# Patient Record
Sex: MALE | Race: WHITE | URBAN - METROPOLITAN AREA
[De-identification: names, ages, dates, MRNs, and addresses within clinical notes are randomized per-mention and may not be internally consistent; named-entity substitution may affect disease eponyms.]

---

## 2022-12-07 ENCOUNTER — OFFICE (OUTPATIENT)
Dept: URBAN - METROPOLITAN AREA CLINIC 115 | Facility: CLINIC | Age: 67
Setting detail: OPHTHALMOLOGY
End: 2022-12-07
Payer: MEDICARE

## 2022-12-07 DIAGNOSIS — H11.153: ICD-10-CM

## 2022-12-07 DIAGNOSIS — H25.13: ICD-10-CM

## 2022-12-07 DIAGNOSIS — H43.813: ICD-10-CM

## 2022-12-07 DIAGNOSIS — H52.4: ICD-10-CM

## 2022-12-07 PROCEDURE — 92015 DETERMINE REFRACTIVE STATE: CPT | Performed by: OPHTHALMOLOGY

## 2022-12-07 PROCEDURE — 92014 COMPRE OPH EXAM EST PT 1/>: CPT | Performed by: OPHTHALMOLOGY

## 2022-12-07 PROCEDURE — 92250 FUNDUS PHOTOGRAPHY W/I&R: CPT | Performed by: OPHTHALMOLOGY

## 2022-12-07 ASSESSMENT — REFRACTION_CURRENTRX
OD_ADD: +2.50
OS_CYLINDER: -2.50
OD_SPHERE: -3.50
OD_CYLINDER: -1.75
OD_OVR_VA: 20/
OS_OVR_VA: 20/
OD_AXIS: 094
OS_ADD: +2.50
OD_VPRISM_DIRECTION: PROGS
OS_AXIS: 084
OS_SPHERE: -3.25
OS_VPRISM_DIRECTION: PROGS

## 2022-12-07 ASSESSMENT — SPHEQUIV_DERIVED
OD_SPHEQUIV: -4.5
OS_SPHEQUIV: -4.5
OD_SPHEQUIV: -4.625
OD_SPHEQUIV: -4
OS_SPHEQUIV: -4.125
OS_SPHEQUIV: -4.625

## 2022-12-07 ASSESSMENT — CONFRONTATIONAL VISUAL FIELD TEST (CVF)
OS_FINDINGS: FULL
OD_FINDINGS: FULL

## 2022-12-07 ASSESSMENT — REFRACTION_MANIFEST
OD_SPHERE: -3.50
OD_VA1: 20/20
OS_CYLINDER: -2.25
OS_VA1: 20/20
OD_VA1: 20/20
OS_AXIS: 084
OS_VA1: 20/20-
OS_CYLINDER: -2.25
OS_AXIS: 098
OD_AXIS: 095
OS_ADD: +2.50
OS_SPHERE: -3.00
OU_VA: 20/25-
OU_VA: 20/20
OS_SPHERE: -3.50
OD_CYLINDER: -2.00
OD_ADD: +2.50
OD_ADD: +2.50
OD_SPHERE: -3.50
OD_AXIS: 094
OD_CYLINDER: -2.25
OS_ADD: +2.50

## 2022-12-07 ASSESSMENT — VISUAL ACUITY
OS_BCVA: 20/25-1
OD_BCVA: 20/25-1

## 2022-12-07 ASSESSMENT — TONOMETRY
OS_IOP_MMHG: 17
OD_IOP_MMHG: 15

## 2022-12-07 ASSESSMENT — REFRACTION_AUTOREFRACTION
OD_SPHERE: -2.75
OS_AXIS: 096
OS_SPHERE: -3.25
OS_CYLINDER: -2.50
OD_AXIS: 087
OD_CYLINDER: -2.50

## 2023-12-12 ENCOUNTER — OFFICE (OUTPATIENT)
Dept: URBAN - METROPOLITAN AREA CLINIC 115 | Facility: CLINIC | Age: 68
Setting detail: OPHTHALMOLOGY
End: 2023-12-12
Payer: MEDICARE

## 2023-12-12 DIAGNOSIS — H52.4: ICD-10-CM

## 2023-12-12 DIAGNOSIS — H25.13: ICD-10-CM

## 2023-12-12 DIAGNOSIS — H43.813: ICD-10-CM

## 2023-12-12 DIAGNOSIS — H11.153: ICD-10-CM

## 2023-12-12 PROCEDURE — 92014 COMPRE OPH EXAM EST PT 1/>: CPT | Performed by: OPHTHALMOLOGY

## 2023-12-12 PROCEDURE — 92015 DETERMINE REFRACTIVE STATE: CPT | Performed by: OPHTHALMOLOGY

## 2023-12-12 PROCEDURE — 92250 FUNDUS PHOTOGRAPHY W/I&R: CPT | Performed by: OPHTHALMOLOGY

## 2023-12-12 ASSESSMENT — REFRACTION_CURRENTRX
OD_ADD: +2.50
OD_AXIS: 094
OD_OVR_VA: 20/
OS_VPRISM_DIRECTION: PROGS
OS_AXIS: 084
OD_SPHERE: -3.50
OD_VPRISM_DIRECTION: PROGS
OD_CYLINDER: -1.75
OS_ADD: +2.50
OS_CYLINDER: -2.50
OS_SPHERE: -3.25
OS_OVR_VA: 20/

## 2023-12-12 ASSESSMENT — SPHEQUIV_DERIVED
OS_SPHEQUIV: -4.625
OD_SPHEQUIV: -4.5
OS_SPHEQUIV: -4
OS_SPHEQUIV: -4.125
OD_SPHEQUIV: -4
OD_SPHEQUIV: -4.625
OS_SPHEQUIV: -4.5
OD_SPHEQUIV: -4

## 2023-12-12 ASSESSMENT — REFRACTION_MANIFEST
OS_AXIS: 096
OS_VA1: 20/20-
OU_VA: 20/20
OS_SPHERE: -3.00
OD_VA1: 20/20
OD_CYLINDER: -2.00
OD_CYLINDER: -2.25
OS_ADD: +2.50
OD_ADD: +2.50
OU_VA: 20/25-
OS_AXIS: 098
OS_SPHERE: -2.75
OS_AXIS: 084
OD_AXIS: 094
OD_ADD: +2.50
OS_ADD: +2.50
OD_ADD: +2.50
OD_AXIS: 087
OS_CYLINDER: -2.50
OS_SPHERE: -3.50
OD_SPHERE: -3.50
OS_VA1: 20/20
OD_VA1: 20/20
OD_CYLINDER: -2.00
OD_AXIS: 095
OS_ADD: +2.50
OD_VA1: 20/20
OD_SPHERE: -3.00
OS_CYLINDER: -2.25
OS_CYLINDER: -2.25
OD_SPHERE: -3.50
OU_VA: 20/20
OS_VA1: 20/20

## 2023-12-12 ASSESSMENT — CONFRONTATIONAL VISUAL FIELD TEST (CVF)
OS_FINDINGS: FULL
OD_FINDINGS: FULL

## 2023-12-12 ASSESSMENT — REFRACTION_AUTOREFRACTION
OS_AXIS: 096
OD_SPHERE: -2.75
OS_CYLINDER: -2.50
OD_AXIS: 087
OD_CYLINDER: -2.50
OS_SPHERE: -3.25

## 2023-12-25 ENCOUNTER — INPATIENT (INPATIENT)
Facility: HOSPITAL | Age: 68
LOS: 0 days | Discharge: HOME | DRG: 322 | End: 2023-12-26
Attending: STUDENT IN AN ORGANIZED HEALTH CARE EDUCATION/TRAINING PROGRAM | Admitting: INTERNAL MEDICINE
Payer: MEDICARE

## 2023-12-25 VITALS
RESPIRATION RATE: 18 BRPM | DIASTOLIC BLOOD PRESSURE: 56 MMHG | TEMPERATURE: 98 F | SYSTOLIC BLOOD PRESSURE: 136 MMHG | OXYGEN SATURATION: 98 % | HEART RATE: 86 BPM

## 2023-12-25 DIAGNOSIS — I21.4 NON-ST ELEVATION (NSTEMI) MYOCARDIAL INFARCTION: ICD-10-CM

## 2023-12-25 LAB
ALBUMIN SERPL ELPH-MCNC: 3.7 G/DL — SIGNIFICANT CHANGE UP (ref 3.3–5.2)
ALBUMIN SERPL ELPH-MCNC: 3.7 G/DL — SIGNIFICANT CHANGE UP (ref 3.3–5.2)
ALP SERPL-CCNC: 78 U/L — SIGNIFICANT CHANGE UP (ref 40–120)
ALP SERPL-CCNC: 78 U/L — SIGNIFICANT CHANGE UP (ref 40–120)
ALT FLD-CCNC: 26 U/L — SIGNIFICANT CHANGE UP
ALT FLD-CCNC: 26 U/L — SIGNIFICANT CHANGE UP
ANION GAP SERPL CALC-SCNC: 12 MMOL/L — SIGNIFICANT CHANGE UP (ref 5–17)
ANION GAP SERPL CALC-SCNC: 12 MMOL/L — SIGNIFICANT CHANGE UP (ref 5–17)
ANION GAP SERPL CALC-SCNC: 14 MMOL/L — SIGNIFICANT CHANGE UP (ref 5–17)
ANION GAP SERPL CALC-SCNC: 14 MMOL/L — SIGNIFICANT CHANGE UP (ref 5–17)
APTT BLD: 45.4 SEC — HIGH (ref 24.5–35.6)
APTT BLD: 45.4 SEC — HIGH (ref 24.5–35.6)
APTT BLD: 54 SEC — HIGH (ref 24.5–35.6)
APTT BLD: 54 SEC — HIGH (ref 24.5–35.6)
APTT BLD: 57.8 SEC — HIGH (ref 24.5–35.6)
APTT BLD: 57.8 SEC — HIGH (ref 24.5–35.6)
APTT BLD: >200 SEC — CRITICAL HIGH (ref 24.5–35.6)
APTT BLD: >200 SEC — CRITICAL HIGH (ref 24.5–35.6)
AST SERPL-CCNC: 39 U/L — SIGNIFICANT CHANGE UP
AST SERPL-CCNC: 39 U/L — SIGNIFICANT CHANGE UP
BASOPHILS # BLD AUTO: 0.04 K/UL — SIGNIFICANT CHANGE UP (ref 0–0.2)
BASOPHILS # BLD AUTO: 0.04 K/UL — SIGNIFICANT CHANGE UP (ref 0–0.2)
BASOPHILS NFR BLD AUTO: 0.5 % — SIGNIFICANT CHANGE UP (ref 0–2)
BASOPHILS NFR BLD AUTO: 0.5 % — SIGNIFICANT CHANGE UP (ref 0–2)
BILIRUB SERPL-MCNC: 0.3 MG/DL — LOW (ref 0.4–2)
BILIRUB SERPL-MCNC: 0.3 MG/DL — LOW (ref 0.4–2)
BLD GP AB SCN SERPL QL: SIGNIFICANT CHANGE UP
BLD GP AB SCN SERPL QL: SIGNIFICANT CHANGE UP
BUN SERPL-MCNC: 19.7 MG/DL — SIGNIFICANT CHANGE UP (ref 8–20)
BUN SERPL-MCNC: 19.7 MG/DL — SIGNIFICANT CHANGE UP (ref 8–20)
BUN SERPL-MCNC: 26.3 MG/DL — HIGH (ref 8–20)
BUN SERPL-MCNC: 26.3 MG/DL — HIGH (ref 8–20)
CALCIUM SERPL-MCNC: 8.3 MG/DL — LOW (ref 8.4–10.5)
CALCIUM SERPL-MCNC: 8.3 MG/DL — LOW (ref 8.4–10.5)
CALCIUM SERPL-MCNC: 9.5 MG/DL — SIGNIFICANT CHANGE UP (ref 8.4–10.5)
CALCIUM SERPL-MCNC: 9.5 MG/DL — SIGNIFICANT CHANGE UP (ref 8.4–10.5)
CHLORIDE SERPL-SCNC: 106 MMOL/L — SIGNIFICANT CHANGE UP (ref 96–108)
CHLORIDE SERPL-SCNC: 106 MMOL/L — SIGNIFICANT CHANGE UP (ref 96–108)
CHLORIDE SERPL-SCNC: 110 MMOL/L — HIGH (ref 96–108)
CHLORIDE SERPL-SCNC: 110 MMOL/L — HIGH (ref 96–108)
CHOLEST SERPL-MCNC: 103 MG/DL — SIGNIFICANT CHANGE UP
CHOLEST SERPL-MCNC: 103 MG/DL — SIGNIFICANT CHANGE UP
CK MB CFR SERPL CALC: 7.8 NG/ML — HIGH (ref 0–6.7)
CK MB CFR SERPL CALC: 7.8 NG/ML — HIGH (ref 0–6.7)
CK SERPL-CCNC: 347 U/L — HIGH (ref 30–200)
CK SERPL-CCNC: 347 U/L — HIGH (ref 30–200)
CO2 SERPL-SCNC: 19 MMOL/L — LOW (ref 22–29)
CO2 SERPL-SCNC: 19 MMOL/L — LOW (ref 22–29)
CO2 SERPL-SCNC: 20 MMOL/L — LOW (ref 22–29)
CO2 SERPL-SCNC: 20 MMOL/L — LOW (ref 22–29)
CREAT SERPL-MCNC: 0.82 MG/DL — SIGNIFICANT CHANGE UP (ref 0.5–1.3)
CREAT SERPL-MCNC: 0.82 MG/DL — SIGNIFICANT CHANGE UP (ref 0.5–1.3)
CREAT SERPL-MCNC: 0.98 MG/DL — SIGNIFICANT CHANGE UP (ref 0.5–1.3)
CREAT SERPL-MCNC: 0.98 MG/DL — SIGNIFICANT CHANGE UP (ref 0.5–1.3)
EGFR: 83 ML/MIN/1.73M2 — SIGNIFICANT CHANGE UP
EGFR: 83 ML/MIN/1.73M2 — SIGNIFICANT CHANGE UP
EGFR: 95 ML/MIN/1.73M2 — SIGNIFICANT CHANGE UP
EGFR: 95 ML/MIN/1.73M2 — SIGNIFICANT CHANGE UP
EOSINOPHIL # BLD AUTO: 0.06 K/UL — SIGNIFICANT CHANGE UP (ref 0–0.5)
EOSINOPHIL # BLD AUTO: 0.06 K/UL — SIGNIFICANT CHANGE UP (ref 0–0.5)
EOSINOPHIL NFR BLD AUTO: 0.8 % — SIGNIFICANT CHANGE UP (ref 0–6)
EOSINOPHIL NFR BLD AUTO: 0.8 % — SIGNIFICANT CHANGE UP (ref 0–6)
GLUCOSE SERPL-MCNC: 133 MG/DL — HIGH (ref 70–99)
GLUCOSE SERPL-MCNC: 133 MG/DL — HIGH (ref 70–99)
GLUCOSE SERPL-MCNC: 141 MG/DL — HIGH (ref 70–99)
GLUCOSE SERPL-MCNC: 141 MG/DL — HIGH (ref 70–99)
HCT VFR BLD CALC: 39.1 % — SIGNIFICANT CHANGE UP (ref 39–50)
HCT VFR BLD CALC: 39.1 % — SIGNIFICANT CHANGE UP (ref 39–50)
HCT VFR BLD CALC: 40.5 % — SIGNIFICANT CHANGE UP (ref 39–50)
HCT VFR BLD CALC: 40.5 % — SIGNIFICANT CHANGE UP (ref 39–50)
HCT VFR BLD CALC: 43.4 % — SIGNIFICANT CHANGE UP (ref 39–50)
HCT VFR BLD CALC: 43.4 % — SIGNIFICANT CHANGE UP (ref 39–50)
HCV AB S/CO SERPL IA: 0.1 S/CO — SIGNIFICANT CHANGE UP (ref 0–0.99)
HCV AB S/CO SERPL IA: 0.1 S/CO — SIGNIFICANT CHANGE UP (ref 0–0.99)
HCV AB SERPL-IMP: SIGNIFICANT CHANGE UP
HCV AB SERPL-IMP: SIGNIFICANT CHANGE UP
HDLC SERPL-MCNC: 44 MG/DL — SIGNIFICANT CHANGE UP
HDLC SERPL-MCNC: 44 MG/DL — SIGNIFICANT CHANGE UP
HGB BLD-MCNC: 13.5 G/DL — SIGNIFICANT CHANGE UP (ref 13–17)
HGB BLD-MCNC: 13.5 G/DL — SIGNIFICANT CHANGE UP (ref 13–17)
HGB BLD-MCNC: 14.6 G/DL — SIGNIFICANT CHANGE UP (ref 13–17)
HGB BLD-MCNC: 14.6 G/DL — SIGNIFICANT CHANGE UP (ref 13–17)
HGB BLD-MCNC: 15.2 G/DL — SIGNIFICANT CHANGE UP (ref 13–17)
HGB BLD-MCNC: 15.2 G/DL — SIGNIFICANT CHANGE UP (ref 13–17)
IMM GRANULOCYTES NFR BLD AUTO: 0.5 % — SIGNIFICANT CHANGE UP (ref 0–0.9)
IMM GRANULOCYTES NFR BLD AUTO: 0.5 % — SIGNIFICANT CHANGE UP (ref 0–0.9)
INR BLD: 1.02 RATIO — SIGNIFICANT CHANGE UP (ref 0.85–1.18)
INR BLD: 1.02 RATIO — SIGNIFICANT CHANGE UP (ref 0.85–1.18)
LIPID PNL WITH DIRECT LDL SERPL: 35 MG/DL — SIGNIFICANT CHANGE UP
LIPID PNL WITH DIRECT LDL SERPL: 35 MG/DL — SIGNIFICANT CHANGE UP
LYMPHOCYTES # BLD AUTO: 1.77 K/UL — SIGNIFICANT CHANGE UP (ref 1–3.3)
LYMPHOCYTES # BLD AUTO: 1.77 K/UL — SIGNIFICANT CHANGE UP (ref 1–3.3)
LYMPHOCYTES # BLD AUTO: 22.4 % — SIGNIFICANT CHANGE UP (ref 13–44)
LYMPHOCYTES # BLD AUTO: 22.4 % — SIGNIFICANT CHANGE UP (ref 13–44)
MAGNESIUM SERPL-MCNC: 1.9 MG/DL — SIGNIFICANT CHANGE UP (ref 1.6–2.6)
MAGNESIUM SERPL-MCNC: 1.9 MG/DL — SIGNIFICANT CHANGE UP (ref 1.6–2.6)
MCHC RBC-ENTMCNC: 30.5 PG — SIGNIFICANT CHANGE UP (ref 27–34)
MCHC RBC-ENTMCNC: 30.5 PG — SIGNIFICANT CHANGE UP (ref 27–34)
MCHC RBC-ENTMCNC: 30.7 PG — SIGNIFICANT CHANGE UP (ref 27–34)
MCHC RBC-ENTMCNC: 30.7 PG — SIGNIFICANT CHANGE UP (ref 27–34)
MCHC RBC-ENTMCNC: 31.7 PG — SIGNIFICANT CHANGE UP (ref 27–34)
MCHC RBC-ENTMCNC: 31.7 PG — SIGNIFICANT CHANGE UP (ref 27–34)
MCHC RBC-ENTMCNC: 34.5 GM/DL — SIGNIFICANT CHANGE UP (ref 32–36)
MCHC RBC-ENTMCNC: 34.5 GM/DL — SIGNIFICANT CHANGE UP (ref 32–36)
MCHC RBC-ENTMCNC: 35 GM/DL — SIGNIFICANT CHANGE UP (ref 32–36)
MCHC RBC-ENTMCNC: 35 GM/DL — SIGNIFICANT CHANGE UP (ref 32–36)
MCHC RBC-ENTMCNC: 36 GM/DL — SIGNIFICANT CHANGE UP (ref 32–36)
MCHC RBC-ENTMCNC: 36 GM/DL — SIGNIFICANT CHANGE UP (ref 32–36)
MCV RBC AUTO: 87.1 FL — SIGNIFICANT CHANGE UP (ref 80–100)
MCV RBC AUTO: 87.1 FL — SIGNIFICANT CHANGE UP (ref 80–100)
MCV RBC AUTO: 88 FL — SIGNIFICANT CHANGE UP (ref 80–100)
MCV RBC AUTO: 88 FL — SIGNIFICANT CHANGE UP (ref 80–100)
MCV RBC AUTO: 88.9 FL — SIGNIFICANT CHANGE UP (ref 80–100)
MCV RBC AUTO: 88.9 FL — SIGNIFICANT CHANGE UP (ref 80–100)
MONOCYTES # BLD AUTO: 0.8 K/UL — SIGNIFICANT CHANGE UP (ref 0–0.9)
MONOCYTES # BLD AUTO: 0.8 K/UL — SIGNIFICANT CHANGE UP (ref 0–0.9)
MONOCYTES NFR BLD AUTO: 10.1 % — SIGNIFICANT CHANGE UP (ref 2–14)
MONOCYTES NFR BLD AUTO: 10.1 % — SIGNIFICANT CHANGE UP (ref 2–14)
NEUTROPHILS # BLD AUTO: 5.2 K/UL — SIGNIFICANT CHANGE UP (ref 1.8–7.4)
NEUTROPHILS # BLD AUTO: 5.2 K/UL — SIGNIFICANT CHANGE UP (ref 1.8–7.4)
NEUTROPHILS NFR BLD AUTO: 65.7 % — SIGNIFICANT CHANGE UP (ref 43–77)
NEUTROPHILS NFR BLD AUTO: 65.7 % — SIGNIFICANT CHANGE UP (ref 43–77)
NON HDL CHOLESTEROL: 59 MG/DL — SIGNIFICANT CHANGE UP
NON HDL CHOLESTEROL: 59 MG/DL — SIGNIFICANT CHANGE UP
NT-PROBNP SERPL-SCNC: 214 PG/ML — SIGNIFICANT CHANGE UP (ref 0–300)
NT-PROBNP SERPL-SCNC: 214 PG/ML — SIGNIFICANT CHANGE UP (ref 0–300)
PHOSPHATE SERPL-MCNC: 2.6 MG/DL — SIGNIFICANT CHANGE UP (ref 2.4–4.7)
PHOSPHATE SERPL-MCNC: 2.6 MG/DL — SIGNIFICANT CHANGE UP (ref 2.4–4.7)
PLATELET # BLD AUTO: 156 K/UL — SIGNIFICANT CHANGE UP (ref 150–400)
PLATELET # BLD AUTO: 156 K/UL — SIGNIFICANT CHANGE UP (ref 150–400)
PLATELET # BLD AUTO: 163 K/UL — SIGNIFICANT CHANGE UP (ref 150–400)
PLATELET # BLD AUTO: 163 K/UL — SIGNIFICANT CHANGE UP (ref 150–400)
PLATELET # BLD AUTO: 185 K/UL — SIGNIFICANT CHANGE UP (ref 150–400)
PLATELET # BLD AUTO: 185 K/UL — SIGNIFICANT CHANGE UP (ref 150–400)
POTASSIUM SERPL-MCNC: 3.8 MMOL/L — SIGNIFICANT CHANGE UP (ref 3.5–5.3)
POTASSIUM SERPL-MCNC: 3.8 MMOL/L — SIGNIFICANT CHANGE UP (ref 3.5–5.3)
POTASSIUM SERPL-MCNC: 4 MMOL/L — SIGNIFICANT CHANGE UP (ref 3.5–5.3)
POTASSIUM SERPL-MCNC: 4 MMOL/L — SIGNIFICANT CHANGE UP (ref 3.5–5.3)
POTASSIUM SERPL-SCNC: 3.8 MMOL/L — SIGNIFICANT CHANGE UP (ref 3.5–5.3)
POTASSIUM SERPL-SCNC: 3.8 MMOL/L — SIGNIFICANT CHANGE UP (ref 3.5–5.3)
POTASSIUM SERPL-SCNC: 4 MMOL/L — SIGNIFICANT CHANGE UP (ref 3.5–5.3)
POTASSIUM SERPL-SCNC: 4 MMOL/L — SIGNIFICANT CHANGE UP (ref 3.5–5.3)
PROT SERPL-MCNC: 5.5 G/DL — LOW (ref 6.6–8.7)
PROT SERPL-MCNC: 5.5 G/DL — LOW (ref 6.6–8.7)
PROTHROM AB SERPL-ACNC: 11.3 SEC — SIGNIFICANT CHANGE UP (ref 9.5–13)
PROTHROM AB SERPL-ACNC: 11.3 SEC — SIGNIFICANT CHANGE UP (ref 9.5–13)
RBC # BLD: 4.4 M/UL — SIGNIFICANT CHANGE UP (ref 4.2–5.8)
RBC # BLD: 4.4 M/UL — SIGNIFICANT CHANGE UP (ref 4.2–5.8)
RBC # BLD: 4.6 M/UL — SIGNIFICANT CHANGE UP (ref 4.2–5.8)
RBC # BLD: 4.6 M/UL — SIGNIFICANT CHANGE UP (ref 4.2–5.8)
RBC # BLD: 4.98 M/UL — SIGNIFICANT CHANGE UP (ref 4.2–5.8)
RBC # BLD: 4.98 M/UL — SIGNIFICANT CHANGE UP (ref 4.2–5.8)
RBC # FLD: 12.6 % — SIGNIFICANT CHANGE UP (ref 10.3–14.5)
RBC # FLD: 12.6 % — SIGNIFICANT CHANGE UP (ref 10.3–14.5)
RBC # FLD: 12.8 % — SIGNIFICANT CHANGE UP (ref 10.3–14.5)
RBC # FLD: 12.8 % — SIGNIFICANT CHANGE UP (ref 10.3–14.5)
RBC # FLD: 13.2 % — SIGNIFICANT CHANGE UP (ref 10.3–14.5)
RBC # FLD: 13.2 % — SIGNIFICANT CHANGE UP (ref 10.3–14.5)
SODIUM SERPL-SCNC: 139 MMOL/L — SIGNIFICANT CHANGE UP (ref 135–145)
SODIUM SERPL-SCNC: 139 MMOL/L — SIGNIFICANT CHANGE UP (ref 135–145)
SODIUM SERPL-SCNC: 142 MMOL/L — SIGNIFICANT CHANGE UP (ref 135–145)
SODIUM SERPL-SCNC: 142 MMOL/L — SIGNIFICANT CHANGE UP (ref 135–145)
T3 SERPL-MCNC: 117 NG/DL — SIGNIFICANT CHANGE UP (ref 80–200)
T3 SERPL-MCNC: 117 NG/DL — SIGNIFICANT CHANGE UP (ref 80–200)
T4 AB SER-ACNC: 6.5 UG/DL — SIGNIFICANT CHANGE UP (ref 4.5–12)
T4 AB SER-ACNC: 6.5 UG/DL — SIGNIFICANT CHANGE UP (ref 4.5–12)
TRIGL SERPL-MCNC: 122 MG/DL — SIGNIFICANT CHANGE UP
TRIGL SERPL-MCNC: 122 MG/DL — SIGNIFICANT CHANGE UP
TROPONIN T, HIGH SENSITIVITY RESULT: 39 NG/L — SIGNIFICANT CHANGE UP (ref 0–51)
TROPONIN T, HIGH SENSITIVITY RESULT: 39 NG/L — SIGNIFICANT CHANGE UP (ref 0–51)
TROPONIN T, HIGH SENSITIVITY RESULT: 63 NG/L — HIGH (ref 0–51)
TROPONIN T, HIGH SENSITIVITY RESULT: 63 NG/L — HIGH (ref 0–51)
TROPONIN T, HIGH SENSITIVITY RESULT: 80 NG/L — HIGH (ref 0–51)
TROPONIN T, HIGH SENSITIVITY RESULT: 80 NG/L — HIGH (ref 0–51)
TSH SERPL-MCNC: 1.57 UIU/ML — SIGNIFICANT CHANGE UP (ref 0.27–4.2)
TSH SERPL-MCNC: 1.57 UIU/ML — SIGNIFICANT CHANGE UP (ref 0.27–4.2)
WBC # BLD: 7.25 K/UL — SIGNIFICANT CHANGE UP (ref 3.8–10.5)
WBC # BLD: 7.25 K/UL — SIGNIFICANT CHANGE UP (ref 3.8–10.5)
WBC # BLD: 7.91 K/UL — SIGNIFICANT CHANGE UP (ref 3.8–10.5)
WBC # BLD: 7.91 K/UL — SIGNIFICANT CHANGE UP (ref 3.8–10.5)
WBC # BLD: 8.27 K/UL — SIGNIFICANT CHANGE UP (ref 3.8–10.5)
WBC # BLD: 8.27 K/UL — SIGNIFICANT CHANGE UP (ref 3.8–10.5)
WBC # FLD AUTO: 7.25 K/UL — SIGNIFICANT CHANGE UP (ref 3.8–10.5)
WBC # FLD AUTO: 7.25 K/UL — SIGNIFICANT CHANGE UP (ref 3.8–10.5)
WBC # FLD AUTO: 7.91 K/UL — SIGNIFICANT CHANGE UP (ref 3.8–10.5)
WBC # FLD AUTO: 7.91 K/UL — SIGNIFICANT CHANGE UP (ref 3.8–10.5)
WBC # FLD AUTO: 8.27 K/UL — SIGNIFICANT CHANGE UP (ref 3.8–10.5)
WBC # FLD AUTO: 8.27 K/UL — SIGNIFICANT CHANGE UP (ref 3.8–10.5)

## 2023-12-25 PROCEDURE — 71045 X-RAY EXAM CHEST 1 VIEW: CPT | Mod: 26

## 2023-12-25 PROCEDURE — 99291 CRITICAL CARE FIRST HOUR: CPT

## 2023-12-25 PROCEDURE — 93010 ELECTROCARDIOGRAM REPORT: CPT | Mod: 76

## 2023-12-25 PROCEDURE — 99233 SBSQ HOSP IP/OBS HIGH 50: CPT

## 2023-12-25 PROCEDURE — 93306 TTE W/DOPPLER COMPLETE: CPT | Mod: 26

## 2023-12-25 RX ORDER — ATORVASTATIN CALCIUM 80 MG/1
80 TABLET, FILM COATED ORAL AT BEDTIME
Refills: 0 | Status: DISCONTINUED | OUTPATIENT
Start: 2023-12-25 | End: 2023-12-26

## 2023-12-25 RX ORDER — ESMOLOL HCL 100MG/10ML
39100 VIAL (ML) INTRAVENOUS ONCE
Refills: 0 | Status: COMPLETED | OUTPATIENT
Start: 2023-12-25 | End: 2023-12-25

## 2023-12-25 RX ORDER — METOPROLOL TARTRATE 50 MG
5 TABLET ORAL ONCE
Refills: 0 | Status: COMPLETED | OUTPATIENT
Start: 2023-12-25 | End: 2023-12-25

## 2023-12-25 RX ORDER — TICAGRELOR 90 MG/1
180 TABLET ORAL ONCE
Refills: 0 | Status: COMPLETED | OUTPATIENT
Start: 2023-12-25 | End: 2023-12-25

## 2023-12-25 RX ORDER — METOPROLOL TARTRATE 50 MG
25 TABLET ORAL EVERY 8 HOURS
Refills: 0 | Status: DISCONTINUED | OUTPATIENT
Start: 2023-12-25 | End: 2023-12-25

## 2023-12-25 RX ORDER — HEPARIN SODIUM 5000 [USP'U]/ML
4700 INJECTION INTRAVENOUS; SUBCUTANEOUS EVERY 6 HOURS
Refills: 0 | Status: DISCONTINUED | OUTPATIENT
Start: 2023-12-25 | End: 2023-12-26

## 2023-12-25 RX ORDER — HEPARIN SODIUM 5000 [USP'U]/ML
4700 INJECTION INTRAVENOUS; SUBCUTANEOUS ONCE
Refills: 0 | Status: COMPLETED | OUTPATIENT
Start: 2023-12-25 | End: 2023-12-25

## 2023-12-25 RX ORDER — METOPROLOL TARTRATE 50 MG
25 TABLET ORAL EVERY 12 HOURS
Refills: 0 | Status: DISCONTINUED | OUTPATIENT
Start: 2023-12-25 | End: 2023-12-26

## 2023-12-25 RX ORDER — LEVOTHYROXINE SODIUM 125 MCG
88 TABLET ORAL DAILY
Refills: 0 | Status: DISCONTINUED | OUTPATIENT
Start: 2023-12-25 | End: 2023-12-26

## 2023-12-25 RX ORDER — LEVOTHYROXINE SODIUM 125 MCG
75 TABLET ORAL DAILY
Refills: 0 | Status: DISCONTINUED | OUTPATIENT
Start: 2023-12-25 | End: 2023-12-25

## 2023-12-25 RX ORDER — ESMOLOL HCL 100MG/10ML
50 VIAL (ML) INTRAVENOUS
Qty: 2500 | Refills: 0 | Status: DISCONTINUED | OUTPATIENT
Start: 2023-12-25 | End: 2023-12-25

## 2023-12-25 RX ORDER — SODIUM CHLORIDE 9 MG/ML
1000 INJECTION INTRAMUSCULAR; INTRAVENOUS; SUBCUTANEOUS ONCE
Refills: 0 | Status: COMPLETED | OUTPATIENT
Start: 2023-12-25 | End: 2023-12-25

## 2023-12-25 RX ORDER — MAGNESIUM SULFATE 500 MG/ML
2 VIAL (ML) INJECTION ONCE
Refills: 0 | Status: COMPLETED | OUTPATIENT
Start: 2023-12-25 | End: 2023-12-25

## 2023-12-25 RX ORDER — NITROGLYCERIN 6.5 MG
0.4 CAPSULE, EXTENDED RELEASE ORAL ONCE
Refills: 0 | Status: COMPLETED | OUTPATIENT
Start: 2023-12-25 | End: 2023-12-25

## 2023-12-25 RX ORDER — HEPARIN SODIUM 5000 [USP'U]/ML
700 INJECTION INTRAVENOUS; SUBCUTANEOUS
Qty: 25000 | Refills: 0 | Status: DISCONTINUED | OUTPATIENT
Start: 2023-12-25 | End: 2023-12-26

## 2023-12-25 RX ORDER — ASPIRIN/CALCIUM CARB/MAGNESIUM 324 MG
325 TABLET ORAL DAILY
Refills: 0 | Status: DISCONTINUED | OUTPATIENT
Start: 2023-12-25 | End: 2023-12-26

## 2023-12-25 RX ORDER — HEPARIN SODIUM 5000 [USP'U]/ML
INJECTION INTRAVENOUS; SUBCUTANEOUS
Qty: 25000 | Refills: 0 | Status: DISCONTINUED | OUTPATIENT
Start: 2023-12-25 | End: 2023-12-25

## 2023-12-25 RX ADMIN — ATORVASTATIN CALCIUM 80 MILLIGRAM(S): 80 TABLET, FILM COATED ORAL at 21:47

## 2023-12-25 RX ADMIN — Medication 25 MILLIGRAM(S): at 11:51

## 2023-12-25 RX ADMIN — HEPARIN SODIUM 900 UNIT(S)/HR: 5000 INJECTION INTRAVENOUS; SUBCUTANEOUS at 22:49

## 2023-12-25 RX ADMIN — Medication 75 MICROGRAM(S): at 12:56

## 2023-12-25 RX ADMIN — HEPARIN SODIUM 950 UNIT(S)/HR: 5000 INJECTION INTRAVENOUS; SUBCUTANEOUS at 03:49

## 2023-12-25 RX ADMIN — Medication 39100 MICROGRAM(S): at 03:59

## 2023-12-25 RX ADMIN — Medication 23.5 MICROGRAM(S)/KG/MIN: at 09:09

## 2023-12-25 RX ADMIN — Medication 25 GRAM(S): at 10:17

## 2023-12-25 RX ADMIN — Medication 5 MILLIGRAM(S): at 03:48

## 2023-12-25 RX ADMIN — HEPARIN SODIUM 700 UNIT(S)/HR: 5000 INJECTION INTRAVENOUS; SUBCUTANEOUS at 11:23

## 2023-12-25 RX ADMIN — HEPARIN SODIUM 0 UNIT(S)/HR: 5000 INJECTION INTRAVENOUS; SUBCUTANEOUS at 09:09

## 2023-12-25 RX ADMIN — SODIUM CHLORIDE 1000 MILLILITER(S): 9 INJECTION INTRAMUSCULAR; INTRAVENOUS; SUBCUTANEOUS at 03:52

## 2023-12-25 RX ADMIN — Medication 23.5 MICROGRAM(S)/KG/MIN: at 03:58

## 2023-12-25 RX ADMIN — HEPARIN SODIUM 900 UNIT(S)/HR: 5000 INJECTION INTRAVENOUS; SUBCUTANEOUS at 17:35

## 2023-12-25 RX ADMIN — Medication 0.4 MILLIGRAM(S): at 03:34

## 2023-12-25 RX ADMIN — TICAGRELOR 180 MILLIGRAM(S): 90 TABLET ORAL at 03:50

## 2023-12-25 RX ADMIN — HEPARIN SODIUM 4700 UNIT(S): 5000 INJECTION INTRAVENOUS; SUBCUTANEOUS at 03:50

## 2023-12-25 RX ADMIN — Medication 0.4 MILLIGRAM(S): at 03:42

## 2023-12-25 RX ADMIN — Medication 325 MILLIGRAM(S): at 11:21

## 2023-12-25 NOTE — ED PROVIDER NOTE - PROGRESS NOTE DETAILS
became hypotensive with second nitro, pressure bag, ivf started, with improvement in BP. IV lopressor given.  discussed with cards again, esmolol started. HR improved  ICU consulted. Was notified by RN that pt's CP worsening, rpt EKG ordered. rpt trop +. discussed with stemi doc, ischemic ekg, rpt ekg with diffuse ST depressions.. 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 new afib, no hx of afib. denies being on any blood thinning meds.   given ntg and Brilinta, started on heparin bolus/drip. pt to be admitted to ICU for further management. Was notified by RN that pt's CP worsening, rpt EKG ordered. rpt trop +. discussed with stemi doc, ischemic ekg, rpt ekg with diffuse ST depressions. 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 new afib, no hx of afib. denies being on any blood thinning meds.   given ntg and Brilinta, started on heparin bolus/drip. became hypotensive with second nitro, pressure bag ivf started, with improvement in BP. IV lopressor given.  discussed with cards again, esmolol started. HR improved  ICU consulted.

## 2023-12-25 NOTE — ED ADULT NURSE REASSESSMENT NOTE - NS ED NURSE REASSESS COMMENT FT1
assumed care of pt at 0715. report received from NATALYA Velasquez. charting as noted. rr even and unlabored. anox4. continued cardiac monitoring in place. iv intact. pt currently have esmolol and heparin infusing at this time. denies chest pain sob or dizziness at this time. pt educated on plan of care, pt able to successfully teach back plan of care to RN, RN will continue to reeducate pt during hospital stay.

## 2023-12-25 NOTE — H&P ADULT - NS PANP OPT1 GEN_ALL_CORE
No pertinent family history in first degree relatives
I independently performed the documented history, exam, and medical decision making.

## 2023-12-25 NOTE — H&P ADULT - ASSESSMENT
69 y/o male with pmhx of triple bypass in 2006, hair cell leukemia 2006 (in remission), bladder CA s/p TURP x 2 in remission, HTN, and HLD admitted for afib RVR.     -Ischemic changes on EKG were transient in nature and believed to be related to episode of afib RVR. Will undergo nonurgent cardiac cath by Dr. Mauricio today.   -Actively trending cardiac enzymes for peak. Received ASA and Brilinta and was started on heparin gtt.    -Chest pain now resolved, however pt developed hypotension after sublingual ntiro and lopressor. Was given bolus IVF fluid with improvement.   -Pt started on esmolol gtt to achieve rate control for afib. Titrating to goal HR < 100. Will transition to longer acting BB as hemodynamically tolerated.   -Resuming home statin and aspirin   -Ordered formal echo for morning   -Continue with levothyroxine. Thyroid function tests pending   -Continue to trend laboratory markers of perfusion.     CRITICAL CARE TIME SPENT: 35 minutes   Time spent evaluating/treating patient with medical issues that pose a high risk for life threatening deterioration, and/or end-organ damage, reviewing data/labs/imaging, discussing case with multidisciplinary team, discussing plan/goals of care with patient/family. Non-inclusive of procedure time. Date of entry of this note is equal to the date of services rendered.     Case Discussed with ICU Attending, Dr. Rangel    69 y/o male with pmhx of triple bypass in 2006, hair cell leukemia 2006 (in remission), bladder CA s/p TURP x 2 in remission, HTN, and HLD admitted for afib RVR.     -Ischemic changes on EKG were transient in nature and believed to be related to episode of afib RVR. Will undergo nonurgent cardiac cath by Dr. Mauricio today.   -Actively trending cardiac enzymes for peak. Received ASA and Brilinta and was started on heparin gtt.    -Chest pain now resolved, however pt developed hypotension after sublingual ntiro and lopressor. Was given bolus IVF fluid with improvement.   -Pt started on esmolol gtt to achieve rate control for afib. Titrating to goal HR < 100. Will transition to longer acting BB as hemodynamically tolerated.   -Resuming home statin and aspirin. Lipid panel ordered.   -Ordered formal echo for morning   -Continue with levothyroxine. Thyroid function tests pending   -Continue to trend laboratory markers of perfusion.     CRITICAL CARE TIME SPENT: 35 minutes   Time spent evaluating/treating patient with medical issues that pose a high risk for life threatening deterioration, and/or end-organ damage, reviewing data/labs/imaging, discussing case with multidisciplinary team, discussing plan/goals of care with patient/family. Non-inclusive of procedure time. Date of entry of this note is equal to the date of services rendered.     Case Discussed with ICU Attending, Dr. Rangel

## 2023-12-25 NOTE — ED PROVIDER NOTE - CARE PLAN
Principal Discharge DX:	NSTEMI (non-ST elevation myocardial infarction)  Secondary Diagnosis:	Atrial fibrillation with RVR   1

## 2023-12-25 NOTE — H&P ADULT - NS PANP COMMENT GEN_ALL_CORE FT
68M former smoker with hx of CAD s/p CABG, hair cell leukemia 2006 (in remission), bladder CA s/p TURP x 2 in remission, HTN, HLD presented with acute onset severe chest pain with associated diaphoresis and dyspnea. Pt was in usual state of health prior to this. He was following with [facility] and reports stress test last year that was normal per his understanding. Pt reports consumption of multiple alcoholic drinks. Pt had recurrent episodes of severe chest pain while in the ED and was noted to be in Afib RVR with no reported prior hx of this. Pt was given sublingual x 2 initially for the chest pain but developed hypotension after the second dose requiring bolus. EKG demonstrated Afib with RVR with diffuse ST depressions. Interventional cardiologist was contacted by ED for consideration for possible cath and advised starting esmolol drip for rate control. ST depressions improved with better rate control and chest pain resolved when seen. Troponin uptrending. Started on heparin gtt and loaded with brilinta per cardiology with plan for cardiac cath today. Will monitor in ICU given concern for unstable angina and Afib with RVR. Titrate esmolol as tolerated to maintain HR<110. F/U echo. Trend troponin to peak. Follow-up cardiology recs. GDMT as tolerated. Reports he has chronically been taking ASA 325mg since CABG. F/U TFTs. 68M former smoker with hx of CAD s/p CABG, hair cell leukemia 2006 (in remission), bladder CA s/p TURP x 2 in remission, HTN, HLD presented with acute onset severe chest pain with associated diaphoresis and dyspnea. Pt was in usual state of health prior to this. He was following with [facility] and reports stress test last year that was normal per his understanding. Pt reports consumption of multiple alcoholic drinks. Pt had recurrent episodes of severe chest pain while in the ED and was noted to be in Afib RVR with no reported prior hx of this. Pt was given sublingual x 2 initially for the chest pain but developed hypotension after the second dose requiring bolus. EKG demonstrated Afib with RVR with diffuse ST depressions. Interventional cardiologist was contacted by ED for consideration for possible cath and advised starting esmolol drip for rate control. ST depressions improved with better rate control and chest pain resolved when seen. Troponin uptrending. Started on heparin gtt and loaded with brilinta per cardiology with plan for cardiac cath today. Will monitor in ICU given concern for unstable angina and Afib with RVR. Titrate esmolol as tolerated to maintain HR<110. F/U echo. Trend troponin to peak. Follow-up cardiology recs. GDMT as tolerated. Reports he has chronically been taking ASA 325mg since CABG. F/U TFTs.

## 2023-12-25 NOTE — CHART NOTE - NSCHARTNOTEFT_GEN_A_CORE
INTERVAL HPI: Pt evaluated at bedside, pt reports resolution in symptoms of chest pain. Pt's afib is currently rate controlled, the need for full AC in afib discussed with pt and family. Cardiology consult placed. Non-urgent catheterisation deferred by cardiology due to resolution in symptoms and ischemia attributed to demand ischemia.     MEDICATIONS  (STANDING):  aspirin 325 milliGRAM(s) Oral daily  atorvastatin 80 milliGRAM(s) Oral at bedtime  esmolol  Infusion 50 MICROgram(s)/kG/Min (23.5 mL/Hr) IV Continuous <Continuous>  heparin  Infusion. 700 Unit(s)/Hr (7 mL/Hr) IV Continuous <Continuous>  levothyroxine 88 MICROGram(s) Oral daily  metoprolol tartrate 25 milliGRAM(s) Oral every 8 hours    MEDICATIONS  (PRN):  heparin   Injectable 4700 Unit(s) IV Push every 6 hours PRN For aPTT less than 40    Allergies    No Known Allergies    Intolerances    REVIEW OF SYSTEMS  CONSTITUTIONAL: No fever, weight loss, or fatigue  RESPIRATORY: No cough, wheezing, chills or hemoptysis; No shortness of breath  CARDIOVASCULAR: No chest pain, palpitations, dizziness, or leg swelling  GASTROINTESTINAL: No abdominal or epigastric pain. No nausea, vomiting, or hematemesis; No diarrhea or constipation. No melena or hematochezia.  NEUROLOGICAL: No headaches, memory loss, loss of strength, numbness, or tremors  MUSCULOSKELETAL: No joint pain or swelling; No muscle, back, or extremity pain      Vital Signs Last 24 Hrs  T(C): 36.4 (25 Dec 2023 15:23), Max: 37.2 (25 Dec 2023 07:19)  T(F): 97.5 (25 Dec 2023 15:23), Max: 98.9 (25 Dec 2023 07:19)  HR: 61 (25 Dec 2023 15:00) (58 - 142)  BP: 111/60 (25 Dec 2023 15:00) (87/62 - 147/69)  BP(mean): 73 (25 Dec 2023 15:00) (70 - 111)  RR: 17 (25 Dec 2023 15:00) (12 - 33)  SpO2: 96% (25 Dec 2023 15:00) (93% - 98%)    Parameters below as of 25 Dec 2023 14:00  Patient On (Oxygen Delivery Method): room air    PHYSICAL EXAM  GENERAL: Comfortable in bed  HEAD:  Atraumatic, Normocephalic  EYES: Conjunctiva and sclera clear  NECK: Supple  NERVOUS SYSTEM:  Alert & Oriented X3, No gross focal deficits  CHEST/LUNG: Clear to auscultation bilaterally  HEART: Regular rate and rhythm  ABDOMEN: Soft, Nontender, Nondistended; Bowel sounds present  EXTREMITIES:  No edema  SKIN: No rashes or lesions    LABS:                        13.5   8.27  )-----------( 156      ( 25 Dec 2023 09:30 )             39.1     12-25    142  |  110<H>  |  19.7  ----------------------------<  133<H>  4.0   |  20.0<L>  |  0.82    Ca    8.3<L>      25 Dec 2023 09:30  Phos  2.6     12-25  Mg     1.9     12-25    TPro  5.5<L>  /  Alb  3.7  /  TBili  0.3<L>  /  DBili  x   /  AST  39  /  ALT  26  /  AlkPhos  78  12-25    PT/INR - ( 25 Dec 2023 03:51 )   PT: 11.3 sec;   INR: 1.02 ratio         PTT - ( 25 Dec 2023 10:20 )  PTT:54.0 sec  Urinalysis Basic - ( 25 Dec 2023 09:30 )    Color: x / Appearance: x / SG: x / pH: x  Gluc: 133 mg/dL / Ketone: x  / Bili: x / Urobili: x   Blood: x / Protein: x / Nitrite: x   Leuk Esterase: x / RBC: x / WBC x   Sq Epi: x / Non Sq Epi: x / Bacteria: x    Assessment and Plan    67 y/o male with pmhx of triple bypass in 2006, hair cell leukemia 2006 (in remission), bladder CA s/p TURP x 2 in remission, HTN, and HLD admitted for afib RVR    Neuro  - Neurologically intact  - Avoid sedating medication    Cardio  - HDS stable off pressors  - CHADsVasc - 3, Full AC with Heparin gtt  -Ischemic changes on EKG were transient in nature and believed to be related to episode of afib RVR, non-urgent cardiac catheterization deferred  -Up trending cardiac enzymes  - To continue  -Pt started on esmolol gtt to achieve rate control for afib. Titrating to goal HR < 100. Will transition to longer acting BB as hemodynamically tolerated.   -Resuming home statin and aspirin. Lipid panel ordered.   -Ordered formal echo for morning   -Continue with levothyroxine. Thyroid function tests pending   -Continue to trend laboratory markers of perfusion. BRIEF HOSPITAL COURSE  67 y/o male with pmhx of triple bypass in 2006, hair cell leukemia 2006 (in remission), bladder CA s/p TURP x 2 in remission, HTN, HLD presents with chest pain since midnight. Pt reports that he was sitting down watching TV, when he developed sudden onset mid-sternal/left sided chest pain associated  with SOB and sweating. Denies ever experiencing this before. Took 325 mg aspirin prior to arrival.  Pt follows with Dr. German Umaña cardiologist from [facility]. States last echo and carotid US was 1 yr ago. Pt does admit to drinking alcohol tonight bc he was at a family party but denies daily ETOH use. Quit smoking 50 yrs ago and denies illicit drug use. First EKG here was without evidence of acute ischemic changes and troponin was 39. Pt then complained of increased pain and repeat EKG showed diffuse ST depressions with second troponin elevated at 63. Pt also noted to be in new afib RVR. Was given sublingual NGT, Brilinta, and Lopressor. Pt then became hypotensive. After discussion with cardiology (Dr. Mauricio), patient was placed on esmolol drip. Repeat EKG showed resolution of prior ischemic changes. Cardiology deemed EKG changes were likely from his underlying multivessel disease with new afib. Patient admitted to MICU for further management. Chest pain resolved, pt converted to sinus rhythm.       INTERVAL HPI: Pt evaluated at bedside, pt reports resolution in symptoms of chest pain. Pt's afib is currently rate controlled, the need for full AC in afib discussed with pt and family. Cardiology consult placed. Non-urgent catheterisation deferred by cardiology due to resolution in symptoms and ischemia attributed to demand ischemia.     MEDICATIONS  (STANDING):  aspirin 325 milliGRAM(s) Oral daily  atorvastatin 80 milliGRAM(s) Oral at bedtime  esmolol  Infusion 50 MICROgram(s)/kG/Min (23.5 mL/Hr) IV Continuous <Continuous>  heparin  Infusion. 700 Unit(s)/Hr (7 mL/Hr) IV Continuous <Continuous>  levothyroxine 88 MICROGram(s) Oral daily  metoprolol tartrate 25 milliGRAM(s) Oral every 8 hours    MEDICATIONS  (PRN):  heparin   Injectable 4700 Unit(s) IV Push every 6 hours PRN For aPTT less than 40    Allergies    No Known Allergies    Intolerances    REVIEW OF SYSTEMS  CONSTITUTIONAL: No fever, weight loss, or fatigue  RESPIRATORY: No cough, wheezing, chills or hemoptysis; No shortness of breath  CARDIOVASCULAR: No chest pain, palpitations, dizziness, or leg swelling  GASTROINTESTINAL: No abdominal or epigastric pain. No nausea, vomiting, or hematemesis; No diarrhea or constipation. No melena or hematochezia.  NEUROLOGICAL: No headaches, memory loss, loss of strength, numbness, or tremors  MUSCULOSKELETAL: No joint pain or swelling; No muscle, back, or extremity pain      Vital Signs Last 24 Hrs  T(C): 36.4 (25 Dec 2023 15:23), Max: 37.2 (25 Dec 2023 07:19)  T(F): 97.5 (25 Dec 2023 15:23), Max: 98.9 (25 Dec 2023 07:19)  HR: 61 (25 Dec 2023 15:00) (58 - 142)  BP: 111/60 (25 Dec 2023 15:00) (87/62 - 147/69)  BP(mean): 73 (25 Dec 2023 15:00) (70 - 111)  RR: 17 (25 Dec 2023 15:00) (12 - 33)  SpO2: 96% (25 Dec 2023 15:00) (93% - 98%)    Parameters below as of 25 Dec 2023 14:00  Patient On (Oxygen Delivery Method): room air    PHYSICAL EXAM  GENERAL: Comfortable in bed  HEAD:  Atraumatic, Normocephalic  EYES: Conjunctiva and sclera clear  NECK: Supple  NERVOUS SYSTEM:  Alert & Oriented X3, No gross focal deficits  CHEST/LUNG: Clear to auscultation bilaterally  HEART: Regular rate and rhythm  ABDOMEN: Soft, Nontender, Nondistended; Bowel sounds present  EXTREMITIES:  No edema  SKIN: No rashes or lesions    LABS:                        13.5   8.27  )-----------( 156      ( 25 Dec 2023 09:30 )             39.1     12-25    142  |  110<H>  |  19.7  ----------------------------<  133<H>  4.0   |  20.0<L>  |  0.82    Ca    8.3<L>      25 Dec 2023 09:30  Phos  2.6     12-25  Mg     1.9     12-25    TPro  5.5<L>  /  Alb  3.7  /  TBili  0.3<L>  /  DBili  x   /  AST  39  /  ALT  26  /  AlkPhos  78  12-25    PT/INR - ( 25 Dec 2023 03:51 )   PT: 11.3 sec;   INR: 1.02 ratio         PTT - ( 25 Dec 2023 10:20 )  PTT:54.0 sec  Urinalysis Basic - ( 25 Dec 2023 09:30 )    Color: x / Appearance: x / SG: x / pH: x  Gluc: 133 mg/dL / Ketone: x  / Bili: x / Urobili: x   Blood: x / Protein: x / Nitrite: x   Leuk Esterase: x / RBC: x / WBC x   Sq Epi: x / Non Sq Epi: x / Bacteria: x    Assessment and Plan    67 y/o male with pmhx of triple bypass in 2006, hair cell leukemia 2006 (in remission), bladder CA s/p TURP x 2 in remission, HTN, and HLD admitted for afib RVR    Chest Pain   - Resolved  -Likely demand ischemia 2/2 New onset AFib with RVR  -Cardiology consulted, reccs appreciated  -To be scheduled for cardiac catheterisation procedure  -HDS stable off pressors  -CHADsVasc - 3, Full AC with Heparin gtt  -Ischemic changes on EKG were transient in nature and believed to be related to episode of afib RVR, non-urgent cardiac catheterization deferred  -Up trending cardiac enzymes  -Pt transiently on esmolol gtt to achieve rate control for afib. Titrating to goal HR < 100.   -As pt converted to sinus rhythm, pt transitioned to Metoprolol 25mg q12 with parameters  -Resuming home statin and aspirin.   -Lipid panel WNL  -Formal echo 12/25- W/o wall motion abnormalities, EF 60-65%  -TFT, electrolytes WNL  -Trend BMP To keep K>4, Mg>2    HTN  - Metoprolol tartrate 25mg q12    HLD  -Atorvastatin 80mg HS      VTE : Full AC with Heparin  Dispo: Transfer to Medicine BRIEF HOSPITAL COURSE  67 y/o male with pmhx of triple bypass in 2006, hair cell leukemia 2006 (in remission), bladder CA s/p TURP x 2 in remission, HTN, HLD presents with chest pain since midnight. Pt reports that he was sitting down watching TV, when he developed sudden onset mid-sternal/left sided chest pain associated  with SOB and sweating. Denies ever experiencing this before. Took 325 mg aspirin prior to arrival.  Pt follows with Dr. German Umaña cardiologist from [facility]. States last echo and carotid US was 1 yr ago. Pt does admit to drinking alcohol tonight bc he was at a family party but denies daily ETOH use. Quit smoking 50 yrs ago and denies illicit drug use. First EKG here was without evidence of acute ischemic changes and troponin was 39. Pt then complained of increased pain and repeat EKG showed diffuse ST depressions with second troponin elevated at 63. Pt also noted to be in new afib RVR. Was given sublingual NGT, Brilinta, and Lopressor. Pt then became hypotensive. After discussion with cardiology (Dr. Mauricio), patient was placed on esmolol drip. Repeat EKG showed resolution of prior ischemic changes. Cardiology deemed EKG changes were likely from his underlying multivessel disease with new afib. Patient admitted to MICU for further management. Chest pain resolved, pt converted to sinus rhythm.       INTERVAL HPI: Pt evaluated at bedside, pt reports resolution in symptoms of chest pain. Pt's afib is currently rate controlled, the need for full AC in afib discussed with pt and family. Cardiology consult placed. Non-urgent catheterisation deferred by cardiology due to resolution in symptoms and ischemia attributed to demand ischemia.     MEDICATIONS  (STANDING):  aspirin 325 milliGRAM(s) Oral daily  atorvastatin 80 milliGRAM(s) Oral at bedtime  esmolol  Infusion 50 MICROgram(s)/kG/Min (23.5 mL/Hr) IV Continuous <Continuous>  heparin  Infusion. 700 Unit(s)/Hr (7 mL/Hr) IV Continuous <Continuous>  levothyroxine 88 MICROGram(s) Oral daily  metoprolol tartrate 25 milliGRAM(s) Oral every 8 hours    MEDICATIONS  (PRN):  heparin   Injectable 4700 Unit(s) IV Push every 6 hours PRN For aPTT less than 40    Allergies    No Known Allergies    Intolerances    REVIEW OF SYSTEMS  CONSTITUTIONAL: No fever, weight loss, or fatigue  RESPIRATORY: No cough, wheezing, chills or hemoptysis; No shortness of breath  CARDIOVASCULAR: No chest pain, palpitations, dizziness, or leg swelling  GASTROINTESTINAL: No abdominal or epigastric pain. No nausea, vomiting, or hematemesis; No diarrhea or constipation. No melena or hematochezia.  NEUROLOGICAL: No headaches, memory loss, loss of strength, numbness, or tremors  MUSCULOSKELETAL: No joint pain or swelling; No muscle, back, or extremity pain      Vital Signs Last 24 Hrs  T(C): 36.4 (25 Dec 2023 15:23), Max: 37.2 (25 Dec 2023 07:19)  T(F): 97.5 (25 Dec 2023 15:23), Max: 98.9 (25 Dec 2023 07:19)  HR: 61 (25 Dec 2023 15:00) (58 - 142)  BP: 111/60 (25 Dec 2023 15:00) (87/62 - 147/69)  BP(mean): 73 (25 Dec 2023 15:00) (70 - 111)  RR: 17 (25 Dec 2023 15:00) (12 - 33)  SpO2: 96% (25 Dec 2023 15:00) (93% - 98%)    Parameters below as of 25 Dec 2023 14:00  Patient On (Oxygen Delivery Method): room air    PHYSICAL EXAM  GENERAL: Comfortable in bed  HEAD:  Atraumatic, Normocephalic  EYES: Conjunctiva and sclera clear  NECK: Supple  NERVOUS SYSTEM:  Alert & Oriented X3, No gross focal deficits  CHEST/LUNG: Clear to auscultation bilaterally  HEART: Regular rate and rhythm  ABDOMEN: Soft, Nontender, Nondistended; Bowel sounds present  EXTREMITIES:  No edema  SKIN: No rashes or lesions    LABS:                        13.5   8.27  )-----------( 156      ( 25 Dec 2023 09:30 )             39.1     12-25    142  |  110<H>  |  19.7  ----------------------------<  133<H>  4.0   |  20.0<L>  |  0.82    Ca    8.3<L>      25 Dec 2023 09:30  Phos  2.6     12-25  Mg     1.9     12-25    TPro  5.5<L>  /  Alb  3.7  /  TBili  0.3<L>  /  DBili  x   /  AST  39  /  ALT  26  /  AlkPhos  78  12-25    PT/INR - ( 25 Dec 2023 03:51 )   PT: 11.3 sec;   INR: 1.02 ratio         PTT - ( 25 Dec 2023 10:20 )  PTT:54.0 sec  Urinalysis Basic - ( 25 Dec 2023 09:30 )    Color: x / Appearance: x / SG: x / pH: x  Gluc: 133 mg/dL / Ketone: x  / Bili: x / Urobili: x   Blood: x / Protein: x / Nitrite: x   Leuk Esterase: x / RBC: x / WBC x   Sq Epi: x / Non Sq Epi: x / Bacteria: x    Assessment and Plan    67 y/o male with pmhx of triple bypass in 2006, hair cell leukemia 2006 (in remission), bladder CA s/p TURP x 2 in remission, HTN, and HLD admitted for afib RVR    Chest Pain   - Resolved  -Likely demand ischemia 2/2 New onset AFib with RVR  -Cardiology consulted, reccs appreciated  -To be scheduled for cardiac catheterisation procedure  -HDS stable off pressors  -CHADsVasc - 3, Full AC with Heparin gtt  -Ischemic changes on EKG were transient in nature and believed to be related to episode of afib RVR, non-urgent cardiac catheterization deferred  -Up trending cardiac enzymes  -Pt transiently on esmolol gtt to achieve rate control for afib. Titrating to goal HR < 100.   -As pt converted to sinus rhythm, pt transitioned to Metoprolol 25mg q12 with parameters  -Resuming home statin and aspirin.   -Lipid panel WNL  -Formal echo 12/25- W/o wall motion abnormalities, EF 60-65%  -TFT, electrolytes WNL  -Trend BMP To keep K>4, Mg>2    HTN  - Metoprolol tartrate 25mg q12    HLD  -Atorvastatin 80mg HS      VTE : Full AC with Heparin  Dispo: Transfer to Medicine

## 2023-12-25 NOTE — ED PROVIDER NOTE - CLINICAL SUMMARY MEDICAL DECISION MAKING FREE TEXT BOX
69 yo male with pmhx of triple bypass in 2006, hair cell leukemia 2006 (in remission), bladder CA s/p TURP x2 in remission, HTN, HLD presents with CP since midnight. EKG without evidence of acute ischemic changes. 67 yo male with pmhx of triple bypass in 2006, hair cell leukemia 2006 (in remission), bladder CA s/p TURP x2 in remission, HTN, HLD presents with CP since midnight. EKG without evidence of acute ischemic changes. 69 yo male with pmhx of triple bypass in 2006, hair cell leukemia 2006 (in remission), bladder CA s/p TURP x2 in remission, HTN, HLD presents with CP since midnight. 1st EKG without evidence of acute ischemic changes. 1st trop 39. pt c/o increased CP at time of 2nd trop coming back, 2nd trop elevated 63. rpt EKG with evidence of ischemia, STEMI attending called. agreed ekg likely multivessel dz. Pt also noted to be in new afib with RVR, denies hx of afib. sublingual NTG, Brilinta, and heparin drip/bolus started. After 2nd NTG given, pt became hypotensive. BP improved s/p pressure bag and IVF. IV lopressor for afib. discussed with cards again, esmolol started. HR improved. Pt to be admitted to ICU for further management. 67 yo male with pmhx of triple bypass in 2006, hair cell leukemia 2006 (in remission), bladder CA s/p TURP x2 in remission, HTN, HLD presents with CP since midnight. 1st EKG without evidence of acute ischemic changes. 1st trop 39. pt c/o increased CP at time of 2nd trop coming back, 2nd trop elevated 63. rpt EKG with evidence of ischemia, STEMI attending called. agreed ekg likely multivessel dz. Pt also noted to be in new afib with RVR, denies hx of afib. sublingual NTG, Brilinta, and heparin drip/bolus started. After 2nd NTG given, pt became hypotensive. BP improved s/p pressure bag and IVF. IV lopressor for afib. discussed with cards again, esmolol started. HR improved. Pt to be admitted to ICU for further management. 67 yo male with pmhx of triple bypass in 2006, hair cell leukemia 2006 (in remission), bladder CA s/p TURP x2 in remission, HTN, HLD presents with CP since midnight. 1st EKG without evidence of acute ischemic changes. 1st trop 39. pt c/o increased CP at time of 2nd trop coming back, 2nd trop elevated 63. rpt EKG with evidence of ischemia, STEMI attending called. agreed ekg likely multivessel dz. Pt also noted to be in new afib with RVR, denies hx of afib. sublingual NTG, Brilinta, and heparin drip/bolus started. After 2nd NTG given, pt became hypotensive. BP improved s/p pressure bag IVF. IV lopressor for afib. discussed with cards again, esmolol started. HR improved. Pt to be admitted to ICU for further management and likely cardiac catheterization.

## 2023-12-25 NOTE — PROGRESS NOTE ADULT - SUBJECTIVE AND OBJECTIVE BOX
JACKELINE CONNOLLY    [id number]    68y      Male    CC: chest pain     INTERVAL HPI/OVERNIGHT EVENTS: 67 y/o male with pmhx of triple bypass in 2006, hair cell leukemia 2006 (in remission), bladder CA s/p TURP x 2 in remission, HTN, HLD presents with chest pain since midnight. Pt reports that he was sitting down watching TV, when he developed sudden onset mid-sternal/left sided chest pain associated  with SOB and sweating. Denies ever experiencing this before. Took 325 mg aspirin prior to arrival.  Pt follows with Dr. German Umaña cardiologist from [facility]. States last echo and carotid US was 1 yr ago but unsure of results. Pt does admit to drinking alcohol tonight bc he was at a family party but denies daily ETOH use. Quit smoking 50 yrs ago and denies illicit drug use. First EKG here was without evidence of acute ischemic changes and troponin was 39. Pt then complained of increased pain and repeat EKG showed diffuse ST depressions with second troponin elevated at 63. Pt also noted to be in new afib RVR. Was given sublingual NGT, Brilinta, and Lopressor. Pt then became hypotensive. After discussion with cardiology (Dr. Mauricio), patient was placed on esmolol drip. Repeat EKG showed resolution of prior ischemic changes. Cardiology deemed EKG changes were likely from his underlying multivessel disease with new afib and is planning for nonurgent cardiac cath today. Patient admitted to MICU for further management. (25-Dec-2023)    Pt started on heparin gtt, esmolol gtt,       REVIEW OF SYSTEMS:    CONSTITUTIONAL: No fever, weight loss, or fatigue  RESPIRATORY: No cough, wheezing, hemoptysis; No shortness of breath  CARDIOVASCULAR: No chest pain, palpitations  GASTROINTESTINAL: No abdominal or epigastric pain. No nausea, vomiting  NEUROLOGICAL: No headaches, memory loss, loss of strength.    Vital Signs Last 24 Hrs  T(C): 36.4 (25 Dec 2023 15:23), Max: 37.2 (25 Dec 2023 07:19)  T(F): 97.5 (25 Dec 2023 15:23), Max: 98.9 (25 Dec 2023 07:19)  HR: 59 (25 Dec 2023 16:00) (58 - 142)  BP: 132/76 (25 Dec 2023 16:00) (87/62 - 147/69)  BP(mean): 94 (25 Dec 2023 16:00) (70 - 111)  RR: 17 (25 Dec 2023 16:00) (12 - 33)  SpO2: 96% (25 Dec 2023 16:00) (93% - 98%)    Parameters below as of 25 Dec 2023 16:00  Patient On (Oxygen Delivery Method): room air        PHYSICAL EXAM:    GENERAL: NAD  HEENT: PERRL, +EOMI  NECK: soft, supple  CHEST/LUNG: Clear to auscultation bilaterally; No wheezing  HEART: S1S2+, Regular rate and rhythm; No murmurs, rubs, or gallops  ABDOMEN: Soft, Nontender, Nondistended; Bowel sounds present  SKIN: No rashes or lesions  NEURO: AAOX3, no focal deficits, no motor or sensory loss  PSYCH: normal mood    LABS:                        13.5   8.27  )-----------( 156      ( 25 Dec 2023 09:30 )             39.1     12-25    142  |  110<H>  |  19.7  ----------------------------<  133<H>  4.0   |  20.0<L>  |  0.82    Ca    8.3<L>      25 Dec 2023 09:30  Phos  2.6     12-25  Mg     1.9     12-25    TPro  5.5<L>  /  Alb  3.7  /  TBili  0.3<L>  /  DBili  x   /  AST  39  /  ALT  26  /  AlkPhos  78  12-25    PT/INR - ( 25 Dec 2023 03:51 )   PT: 11.3 sec;   INR: 1.02 ratio         PTT - ( 25 Dec 2023 10:20 )  PTT:54.0 sec  Urinalysis Basic - ( 25 Dec 2023 09:30 )    Color: x / Appearance: x / SG: x / pH: x  Gluc: 133 mg/dL / Ketone: x  / Bili: x / Urobili: x   Blood: x / Protein: x / Nitrite: x   Leuk Esterase: x / RBC: x / WBC x   Sq Epi: x / Non Sq Epi: x / Bacteria: x          MEDICATIONS  (STANDING):  aspirin 325 milliGRAM(s) Oral daily  atorvastatin 80 milliGRAM(s) Oral at bedtime  heparin  Infusion. 700 Unit(s)/Hr (7 mL/Hr) IV Continuous <Continuous>  levothyroxine 88 MICROGram(s) Oral daily  metoprolol tartrate 25 milliGRAM(s) Oral every 12 hours    MEDICATIONS  (PRN):  heparin   Injectable 4700 Unit(s) IV Push every 6 hours PRN For aPTT less than 40      RADIOLOGY & ADDITIONAL TESTS:   JACKELINE CONNOLLY    [id number]    68y      Male    CC: chest pain     INTERVAL HPI/OVERNIGHT EVENTS: 69 y/o male with pmhx of triple bypass in 2006, hair cell leukemia 2006 (in remission), bladder CA s/p TURP x 2 in remission, HTN, HLD presents with chest pain since midnight. Pt reports that he was sitting down watching TV, when he developed sudden onset mid-sternal/left sided chest pain associated  with SOB and sweating. Denies ever experiencing this before. Took 325 mg aspirin prior to arrival.  Pt follows with Dr. German Umaña cardiologist from [facility]. States last echo and carotid US was 1 yr ago but unsure of results. Pt does admit to drinking alcohol tonight bc he was at a family party but denies daily ETOH use. Quit smoking 50 yrs ago and denies illicit drug use. First EKG here was without evidence of acute ischemic changes and troponin was 39. Pt then complained of increased pain and repeat EKG showed diffuse ST depressions with second troponin elevated at 63. Pt also noted to be in new afib RVR. Was given sublingual NGT, Brilinta, and Lopressor. Pt then became hypotensive. After discussion with cardiology (Dr. Mauricio), patient was placed on esmolol drip. Repeat EKG showed resolution of prior ischemic changes. Cardiology deemed EKG changes were likely from his underlying multivessel disease with new afib and is planning for nonurgent cardiac cath today. Patient admitted to MICU for further management. (25-Dec-2023)    Pt started on heparin gtt, esmolol gtt,       REVIEW OF SYSTEMS:    CONSTITUTIONAL: No fever, weight loss, or fatigue  RESPIRATORY: No cough, wheezing, hemoptysis; No shortness of breath  CARDIOVASCULAR: No chest pain, palpitations  GASTROINTESTINAL: No abdominal or epigastric pain. No nausea, vomiting  NEUROLOGICAL: No headaches, memory loss, loss of strength.    Vital Signs Last 24 Hrs  T(C): 36.4 (25 Dec 2023 15:23), Max: 37.2 (25 Dec 2023 07:19)  T(F): 97.5 (25 Dec 2023 15:23), Max: 98.9 (25 Dec 2023 07:19)  HR: 59 (25 Dec 2023 16:00) (58 - 142)  BP: 132/76 (25 Dec 2023 16:00) (87/62 - 147/69)  BP(mean): 94 (25 Dec 2023 16:00) (70 - 111)  RR: 17 (25 Dec 2023 16:00) (12 - 33)  SpO2: 96% (25 Dec 2023 16:00) (93% - 98%)    Parameters below as of 25 Dec 2023 16:00  Patient On (Oxygen Delivery Method): room air        PHYSICAL EXAM:    GENERAL: NAD  HEENT: PERRL, +EOMI  NECK: soft, supple  CHEST/LUNG: Clear to auscultation bilaterally; No wheezing  HEART: S1S2+, Regular rate and rhythm; No murmurs, rubs, or gallops  ABDOMEN: Soft, Nontender, Nondistended; Bowel sounds present  SKIN: No rashes or lesions  NEURO: AAOX3, no focal deficits, no motor or sensory loss  PSYCH: normal mood    LABS:                        13.5   8.27  )-----------( 156      ( 25 Dec 2023 09:30 )             39.1     12-25    142  |  110<H>  |  19.7  ----------------------------<  133<H>  4.0   |  20.0<L>  |  0.82    Ca    8.3<L>      25 Dec 2023 09:30  Phos  2.6     12-25  Mg     1.9     12-25    TPro  5.5<L>  /  Alb  3.7  /  TBili  0.3<L>  /  DBili  x   /  AST  39  /  ALT  26  /  AlkPhos  78  12-25    PT/INR - ( 25 Dec 2023 03:51 )   PT: 11.3 sec;   INR: 1.02 ratio         PTT - ( 25 Dec 2023 10:20 )  PTT:54.0 sec  Urinalysis Basic - ( 25 Dec 2023 09:30 )    Color: x / Appearance: x / SG: x / pH: x  Gluc: 133 mg/dL / Ketone: x  / Bili: x / Urobili: x   Blood: x / Protein: x / Nitrite: x   Leuk Esterase: x / RBC: x / WBC x   Sq Epi: x / Non Sq Epi: x / Bacteria: x          MEDICATIONS  (STANDING):  aspirin 325 milliGRAM(s) Oral daily  atorvastatin 80 milliGRAM(s) Oral at bedtime  heparin  Infusion. 700 Unit(s)/Hr (7 mL/Hr) IV Continuous <Continuous>  levothyroxine 88 MICROGram(s) Oral daily  metoprolol tartrate 25 milliGRAM(s) Oral every 12 hours    MEDICATIONS  (PRN):  heparin   Injectable 4700 Unit(s) IV Push every 6 hours PRN For aPTT less than 40      RADIOLOGY & ADDITIONAL TESTS:   JACKELINE CONNOLLY    [id number]    68y      Male    CC: chest pain     INTERVAL HPI/OVERNIGHT EVENTS: 69 y/o male with pmhx of triple bypass in 2006, hair cell leukemia 2006 (in remission), bladder CA s/p TURP x 2 in remission, HTN, HLD presents with chest pain since midnight. Pt reports that he was sitting down watching TV, when he developed sudden onset mid-sternal/left sided chest pain associated  with SOB and sweating. Denies ever experiencing this before. Took 325 mg aspirin prior to arrival.  Pt follows with Dr. German Umaña cardiologist from [facility]. States last echo and carotid US was 1 yr ago but unsure of results. Pt does admit to drinking alcohol tonight bc he was at a family party but denies daily ETOH use. Quit smoking 50 yrs ago and denies illicit drug use. First EKG here was without evidence of acute ischemic changes and troponin was 39. Pt then complained of increased pain and repeat EKG showed diffuse ST depressions with second troponin elevated at 63. Pt also noted to be in new afib RVR. Was given sublingual NGT, Brilinta, and Lopressor. Pt then became hypotensive. After discussion with cardiology (Dr. Mauricio), patient was placed on esmolol drip. Repeat EKG showed resolution of prior ischemic changes. Cardiology deemed EKG changes were likely from his underlying multivessel disease with new afib and is planning for nonurgent cardiac cath today. Patient admitted to MICU for further management. (25-Dec-2023)    Pt started on heparin gtt, esmolol gtt. Chest pain resolved. pt converted to NSR. Pt seen by cardiology. now downgraded to tele in pm 12/25.     Pt seen and examined. Denies cp, sob, dizziness, palpitations. family at bedside       REVIEW OF SYSTEMS:    CONSTITUTIONAL: No fever, weight loss  RESPIRATORY: No cough, wheezing, hemoptysis; No shortness of breath  GASTROINTESTINAL: No abdominal or epigastric pain. No nausea, vomiting  NEUROLOGICAL: No headaches    Vital Signs Last 24 Hrs  T(C): 36.4 (25 Dec 2023 15:23), Max: 37.2 (25 Dec 2023 07:19)  T(F): 97.5 (25 Dec 2023 15:23), Max: 98.9 (25 Dec 2023 07:19)  HR: 59 (25 Dec 2023 16:00) (58 - 142)  BP: 132/76 (25 Dec 2023 16:00) (87/62 - 147/69)  BP(mean): 94 (25 Dec 2023 16:00) (70 - 111)  RR: 17 (25 Dec 2023 16:00) (12 - 33)  SpO2: 96% (25 Dec 2023 16:00) (93% - 98%)    Parameters below as of 25 Dec 2023 16:00  Patient On (Oxygen Delivery Method): room air        PHYSICAL EXAM:    GENERAL: NAD  CHEST/LUNG: Clear to auscultation bilaterally; respirations unlabored on RA   HEART: S1S2+, Regular rate and rhythm  ABDOMEN: Soft, Nontender, Nondistended; Bowel sounds present  SKIN: warm, dry  NEURO: AAOX3, grossly non-focal     LABS:                        13.5   8.27  )-----------( 156      ( 25 Dec 2023 09:30 )             39.1     12-25    142  |  110<H>  |  19.7  ----------------------------<  133<H>  4.0   |  20.0<L>  |  0.82    Ca    8.3<L>      25 Dec 2023 09:30  Phos  2.6     12-25  Mg     1.9     12-25    TPro  5.5<L>  /  Alb  3.7  /  TBili  0.3<L>  /  DBili  x   /  AST  39  /  ALT  26  /  AlkPhos  78  12-25    PT/INR - ( 25 Dec 2023 03:51 )   PT: 11.3 sec;   INR: 1.02 ratio         PTT - ( 25 Dec 2023 10:20 )  PTT:54.0 sec  Urinalysis Basic - ( 25 Dec 2023 09:30 )    Color: x / Appearance: x / SG: x / pH: x  Gluc: 133 mg/dL / Ketone: x  / Bili: x / Urobili: x   Blood: x / Protein: x / Nitrite: x   Leuk Esterase: x / RBC: x / WBC x   Sq Epi: x / Non Sq Epi: x / Bacteria: x          MEDICATIONS  (STANDING):  aspirin 325 milliGRAM(s) Oral daily  atorvastatin 80 milliGRAM(s) Oral at bedtime  heparin  Infusion. 700 Unit(s)/Hr (7 mL/Hr) IV Continuous <Continuous>  levothyroxine 88 MICROGram(s) Oral daily  metoprolol tartrate 25 milliGRAM(s) Oral every 12 hours    MEDICATIONS  (PRN):  heparin   Injectable 4700 Unit(s) IV Push every 6 hours PRN For aPTT less than 40      RADIOLOGY & ADDITIONAL TESTS:   JACKELINE CONNOLLY    [id number]    68y      Male    CC: chest pain     INTERVAL HPI/OVERNIGHT EVENTS: 67 y/o male with pmhx of triple bypass in 2006, hair cell leukemia 2006 (in remission), bladder CA s/p TURP x 2 in remission, HTN, HLD presents with chest pain since midnight. Pt reports that he was sitting down watching TV, when he developed sudden onset mid-sternal/left sided chest pain associated  with SOB and sweating. Denies ever experiencing this before. Took 325 mg aspirin prior to arrival.  Pt follows with Dr. German Umaña cardiologist from [facility]. States last echo and carotid US was 1 yr ago but unsure of results. Pt does admit to drinking alcohol tonight bc he was at a family party but denies daily ETOH use. Quit smoking 50 yrs ago and denies illicit drug use. First EKG here was without evidence of acute ischemic changes and troponin was 39. Pt then complained of increased pain and repeat EKG showed diffuse ST depressions with second troponin elevated at 63. Pt also noted to be in new afib RVR. Was given sublingual NGT, Brilinta, and Lopressor. Pt then became hypotensive. After discussion with cardiology (Dr. Mauricio), patient was placed on esmolol drip. Repeat EKG showed resolution of prior ischemic changes. Cardiology deemed EKG changes were likely from his underlying multivessel disease with new afib and is planning for nonurgent cardiac cath today. Patient admitted to MICU for further management. (25-Dec-2023)    Pt started on heparin gtt, esmolol gtt. Chest pain resolved. pt converted to NSR. Pt seen by cardiology. now downgraded to tele in pm 12/25.     Pt seen and examined. Denies cp, sob, dizziness, palpitations. family at bedside       REVIEW OF SYSTEMS:    CONSTITUTIONAL: No fever, weight loss  RESPIRATORY: No cough, wheezing, hemoptysis; No shortness of breath  GASTROINTESTINAL: No abdominal or epigastric pain. No nausea, vomiting  NEUROLOGICAL: No headaches    Vital Signs Last 24 Hrs  T(C): 36.4 (25 Dec 2023 15:23), Max: 37.2 (25 Dec 2023 07:19)  T(F): 97.5 (25 Dec 2023 15:23), Max: 98.9 (25 Dec 2023 07:19)  HR: 59 (25 Dec 2023 16:00) (58 - 142)  BP: 132/76 (25 Dec 2023 16:00) (87/62 - 147/69)  BP(mean): 94 (25 Dec 2023 16:00) (70 - 111)  RR: 17 (25 Dec 2023 16:00) (12 - 33)  SpO2: 96% (25 Dec 2023 16:00) (93% - 98%)    Parameters below as of 25 Dec 2023 16:00  Patient On (Oxygen Delivery Method): room air        PHYSICAL EXAM:    GENERAL: NAD  CHEST/LUNG: Clear to auscultation bilaterally; respirations unlabored on RA   HEART: S1S2+, Regular rate and rhythm  ABDOMEN: Soft, Nontender, Nondistended; Bowel sounds present  SKIN: warm, dry  NEURO: AAOX3, grossly non-focal     LABS:                        13.5   8.27  )-----------( 156      ( 25 Dec 2023 09:30 )             39.1     12-25    142  |  110<H>  |  19.7  ----------------------------<  133<H>  4.0   |  20.0<L>  |  0.82    Ca    8.3<L>      25 Dec 2023 09:30  Phos  2.6     12-25  Mg     1.9     12-25    TPro  5.5<L>  /  Alb  3.7  /  TBili  0.3<L>  /  DBili  x   /  AST  39  /  ALT  26  /  AlkPhos  78  12-25    PT/INR - ( 25 Dec 2023 03:51 )   PT: 11.3 sec;   INR: 1.02 ratio         PTT - ( 25 Dec 2023 10:20 )  PTT:54.0 sec  Urinalysis Basic - ( 25 Dec 2023 09:30 )    Color: x / Appearance: x / SG: x / pH: x  Gluc: 133 mg/dL / Ketone: x  / Bili: x / Urobili: x   Blood: x / Protein: x / Nitrite: x   Leuk Esterase: x / RBC: x / WBC x   Sq Epi: x / Non Sq Epi: x / Bacteria: x          MEDICATIONS  (STANDING):  aspirin 325 milliGRAM(s) Oral daily  atorvastatin 80 milliGRAM(s) Oral at bedtime  heparin  Infusion. 700 Unit(s)/Hr (7 mL/Hr) IV Continuous <Continuous>  levothyroxine 88 MICROGram(s) Oral daily  metoprolol tartrate 25 milliGRAM(s) Oral every 12 hours    MEDICATIONS  (PRN):  heparin   Injectable 4700 Unit(s) IV Push every 6 hours PRN For aPTT less than 40      RADIOLOGY & ADDITIONAL TESTS:

## 2023-12-25 NOTE — PROGRESS NOTE ADULT - ASSESSMENT
68y/oM PMH CABG x3 (2006), Hairy cell leukemia (2006) in remission, bladder CA s/p TURP x2 in remission, HTN, HLD presenting with sudden onset mid-sternal/L-sided chest pain associated with SOB and sweating. Took 325mg asa prior to arrival. 1st EKG without changes, trop 39. Later pt with increased pain and repeat EKG with diffuse ST depressions and 2nd trop 63, also noted to be in afib RVR. Given sublingual NGT, brilinta, lopressor. Pt then had episode hypotension, resolved with IVF. Started on esmolol gtt. Repeat EKG with resolution of prior changes. As per cardio, ekg changes likely due to underlying multivessel disease with new afib. pt also started on heparin gtt. Chest pain resolved. pt converted to NSR. Pt seen by cardiology. now downgraded to tele in pm 12/25.     Chest Pain   afib RVR, new onset   -s/p MICU  -s/p esmolol gtt   -trending trops, ekg   -cardio following   -cont metoprolol   -cont asa, statin   -LDL 35   -tsh wnl  -TTE: LVEF 60-65%, mid anteroseptal segment and basal inferior segment abnormal   -plan for [facility] on admission, poss 12/26    HTN, HLD   -cont metoprolol, atorvastatin    vte ppx: heparin gtt 68y/oM PMH CABG x3 (2006), Hairy cell leukemia (2006) in remission, bladder CA s/p TURP x2 in remission, HTN, HLD presenting with sudden onset mid-sternal/L-sided chest pain associated with SOB and sweating. Took 325mg asa prior to arrival. 1st EKG without changes, trop 39. Later pt with increased pain and repeat EKG with diffuse ST depressions and 2nd trop 63, also noted to be in afib RVR. Given sublingual NGT, brilinta, lopressor. Pt then had episode hypotension, resolved with IVF. Started on esmolol gtt. Repeat EKG with resolution of prior changes. As per cardio, ekg changes likely due to underlying multivessel disease with new afib. pt also started on heparin gtt. Chest pain resolved. pt converted to NSR. Pt seen by cardiology. now downgraded to tele in pm 12/25.     Chest Pain   afib RVR, new onset   -s/p MICU  -s/p esmolol gtt   -trending trops, ekg   -cardio following   -cont metoprolol   -cont asa, statin   -LDL 35   -tsh wnl  -TTE: LVEF 60-65%, mid anteroseptal segment and basal inferior segment abnormal   -plan for [facility] on admission, poss 12/26    HTN, HLD   -cont metoprolol, atorvastatin    vte ppx: heparin gtt

## 2023-12-25 NOTE — ED ADULT TRIAGE NOTE - CHIEF COMPLAINT QUOTE
PT BIBEMS w/ c/o sudden onset crushing chest pain. States he then became diaphoretic and SOB. States CP has resolved on arrival to ED. Pt. endorsing severe anxiety at this time.

## 2023-12-25 NOTE — ED PROVIDER NOTE - EKG ADDITIONAL INFORMATION FREE TEXT
Initial EKG A-fib RVR with poor baseline, second EKG in the setting of significant chest pain showing  A-fib RVR with diffuse ST-T segment depressions with ST elevation in aVR.  Third EKG once heart rate improved to approximately 120 with significant improvement and prior ischemic changes.

## 2023-12-25 NOTE — H&P ADULT - NSHPPHYSICALEXAM_GEN_ALL_CORE
Physical Examination:    General: No acute distress.  Alert, oriented, interactive, nonfocal    HEENT: NC/AT     PULM: Clear to auscultation bilaterally, no significant sputum production    CVS: Tachycardic, irregular (130s)     ABD: Soft, nondistended, nontender, normoactive bowel sounds, no masses    EXT: No edema, nontender    SKIN: Warm and well perfused, no rashes noted.

## 2023-12-25 NOTE — H&P ADULT - HISTORY OF PRESENT ILLNESS
69 y/o male with pmhx of triple bypass in 2006, hair cell leukemia 2006 (in remission), bladder CA s/p TURP x 2 in remission, HTN, HLD presents with chest pain since midnight. Pt reports that he was sitting down watching TV, when he developed sudden onset mid-sternal/left sided chest pain associated  with SOB and sweating. Denies ever experiencing this before. Took 325 mg aspirin prior to arrival.  Pt follows with Dr. German Umaña cardiologist from [facility]. States last echo and carotid US was 1 yr ago but unsure of results. Pt does admit to drinking alcohol tonight bc he was at a family party but denies daily ETOH use. Quit smoking 50 yrs ago and denies illicit drug use. First EKG here was without evidence of acute ischemic changes and troponin was 39. Pt then complained of increased pain and repeat EKG showed diffuse ST depressions with second troponin elevated at 63. Pt also noted to be in new afib RVR. Was given sublingual NGT, Brilinta, and Lopressor. Pt then became hypotensive. After discussion with cardiology (Dr. Mauricio), patient was placed on esmolol drip. Repeat EKG showed resolution of prior ischemic changes. Cardiology deemed EKG changes were likely from his multivessel disease and is planning for cardiac cath today. Patient admitted to MICU for further management.  69 y/o male with pmhx of triple bypass in 2006, hair cell leukemia 2006 (in remission), bladder CA s/p TURP x 2 in remission, HTN, HLD presents with chest pain since midnight. Pt reports that he was sitting down watching TV, when he developed sudden onset mid-sternal/left sided chest pain associated  with SOB and sweating. Denies ever experiencing this before. Took 325 mg aspirin prior to arrival.  Pt follows with Dr. German Umaña cardiologist from [facility]. States last echo and carotid US was 1 yr ago but unsure of results. Pt does admit to drinking alcohol tonight bc he was at a family party but denies daily ETOH use. Quit smoking 50 yrs ago and denies illicit drug use. First EKG here was without evidence of acute ischemic changes and troponin was 39. Pt then complained of increased pain and repeat EKG showed diffuse ST depressions with second troponin elevated at 63. Pt also noted to be in new afib RVR. Was given sublingual NGT, Brilinta, and Lopressor. Pt then became hypotensive. After discussion with cardiology (Dr. Mauricio), patient was placed on esmolol drip. Repeat EKG showed resolution of prior ischemic changes. Cardiology deemed EKG changes were likely from his multivessel disease and is planning for cardiac cath today. Patient admitted to MICU for further management.  67 y/o male with pmhx of triple bypass in 2006, hair cell leukemia 2006 (in remission), bladder CA s/p TURP x 2 in remission, HTN, HLD presents with chest pain since midnight. Pt reports that he was sitting down watching TV, when he developed sudden onset mid-sternal/left sided chest pain associated  with SOB and sweating. Denies ever experiencing this before. Took 325 mg aspirin prior to arrival.  Pt follows with Dr. German Umaña cardiologist from [facility]. States last echo and carotid US was 1 yr ago but unsure of results. Pt does admit to drinking alcohol tonight bc he was at a family party but denies daily ETOH use. Quit smoking 50 yrs ago and denies illicit drug use. First EKG here was without evidence of acute ischemic changes and troponin was 39. Pt then complained of increased pain and repeat EKG showed diffuse ST depressions with second troponin elevated at 63. Pt also noted to be in new afib RVR. Was given sublingual NGT, Brilinta, and Lopressor. Pt then became hypotensive. After discussion with cardiology (Dr. Mauricio), patient was placed on esmolol drip. Repeat EKG showed resolution of prior ischemic changes. Cardiology deemed EKG changes were likely from his underlying multivessel disease with new afib and is planning for nonurgent cardiac cath today. Patient admitted to MICU for further management.  67 y/o male with pmhx of triple bypass in 2006, hair cell leukemia 2006 (in remission), bladder CA s/p TURP x 2 in remission, HTN, HLD presents with chest pain since midnight. Pt reports that he was sitting down watching TV, when he developed sudden onset mid-sternal/left sided chest pain associated  with SOB and sweating. Denies ever experiencing this before. Took 325 mg aspirin prior to arrival.  Pt follows with Dr. German Umaña cardiologist from [facility]. States last echo and carotid US was 1 yr ago but unsure of results. Pt does admit to drinking alcohol tonight bc he was at a family party but denies daily ETOH use. Quit smoking 50 yrs ago and denies illicit drug use. First EKG here was without evidence of acute ischemic changes and troponin was 39. Pt then complained of increased pain and repeat EKG showed diffuse ST depressions with second troponin elevated at 63. Pt also noted to be in new afib RVR. Was given sublingual NGT, Brilinta, and Lopressor. Pt then became hypotensive. After discussion with cardiology (Dr. Mauricio), patient was placed on esmolol drip. Repeat EKG showed resolution of prior ischemic changes. Cardiology deemed EKG changes were likely from his underlying multivessel disease with new afib and is planning for nonurgent cardiac cath today. Patient admitted to MICU for further management.

## 2023-12-25 NOTE — ED ADULT NURSE REASSESSMENT NOTE - NS ED NURSE REASSESS COMMENT FT1
pt received in CC area of ED from NATALYA Robles. pt A+O x3. pt with c/o 10/10 chest pain. MD Vargas at bedside. pt placed on CM, rapid afib. VS as per flowsheet.

## 2023-12-25 NOTE — ED PROVIDER NOTE - NS ED ATTENDING STATEMENT MOD
This was a shared visit with the HUANG. I reviewed and verified the documentation and independently performed the documented:

## 2023-12-25 NOTE — ED PROVIDER NOTE - PHYSICAL EXAMINATION
Gen: No acute distress, non toxic  HEENT: Mucous membranes moist, pink conjunctivae, EOMI. PERRL  Neck: Supple.  CV: RRR, nl s1/s2. No JVD.   Resp: CTAB, normal rate and effort. No wheezes, rhonchi or crackles.   GI: Abdomen soft, NT, ND. No rebound, no guarding  Neuro: A&O x 3, moving all 4 extremities. no fnd's   MSK: No spine or joint tenderness to palpation  Skin: No rashes. intact and perfused. cap refill <2sec  Vascular: Radial and dorsalis pedal pulses 2+ b/l. No LE edema.

## 2023-12-25 NOTE — ED PROVIDER NOTE - OBJECTIVE STATEMENT
67 yo male with pmhx of triple bypass in 2006, hair cell leukemia 2006 (in remission), bladder CA s/p TURP x2 in remission, HTN, HLD presents with CP since midnight. Pt reports that he was sitting down watching TV, when he developed sudden onset mid-sternal/left sided CP, denies radiation of the pain. Assoc with SOB and sweating. Denies ever experiencing this before. States that the pain didn't start to resolve until got in the ambulance, took 325 mg aspirin.  Pt follows with Dr. German Umaña cardiologist from [facility]. States last echo and carotid US was 1 yr ago but unsure of results. Denies fever, chills, body aches, dizziness, LOC, vision changes, palpitations, DOW, abd pain, n/v/c/d, dysuria, hematuria, back pain, paresthesias in the extremities, rashes, LE edema.  Pt does admit to drinking alcohol tonight bc was at a family party but denies daily ETOH use. Quit smoking 50 yrs ago. Denies illicit drug use. 69 yo male with pmhx of triple bypass in 2006, hair cell leukemia 2006 (in remission), bladder CA s/p TURP x2 in remission, HTN, HLD presents with CP since midnight. Pt reports that he was sitting down watching TV, when he developed sudden onset mid-sternal/left sided CP, denies radiation of the pain. Assoc with SOB and sweating. Denies ever experiencing this before. States that the pain didn't start to resolve until got in the ambulance, took 325 mg aspirin.  Pt follows with Dr. German Umaña cardiologist from [facility]. States last echo and carotid US was 1 yr ago but unsure of results. Denies fever, chills, body aches, dizziness, LOC, vision changes, palpitations, DOW, abd pain, n/v/c/d, dysuria, hematuria, back pain, paresthesias in the extremities, rashes, LE edema.  Pt does admit to drinking alcohol tonight bc was at a family party but denies daily ETOH use. Quit smoking 50 yrs ago. Denies illicit drug use.

## 2023-12-25 NOTE — ED ADULT NURSE REASSESSMENT NOTE - NS ED NURSE REASSESS COMMENT FT1
as per earline Bobo to start heparin infusion prior to ptt results. heparin gtt started at this time. dual verified with NATALYA Cunningham.

## 2023-12-25 NOTE — PATIENT PROFILE ADULT - FALL HARM RISK - RISK INTERVENTIONS
Assistance OOB with selected safe patient handling equipment/Assistance with ambulation/Communicate Fall Risk and Risk Factors to all staff, patient, and family/Reinforce activity limits and safety measures with patient and family/Visual Cue: Yellow wristband/Bed in lowest position, wheels locked, appropriate side rails in place/Call bell, personal items and telephone in reach/Instruct patient to call for assistance before getting out of bed or chair/Non-slip footwear when patient is out of bed/[place] to call system/Physically safe environment - no spills, clutter or unnecessary equipment/Purposeful Proactive Rounding/Room/bathroom lighting operational, light cord in reach Assistance OOB with selected safe patient handling equipment/Assistance with ambulation/Communicate Fall Risk and Risk Factors to all staff, patient, and family/Reinforce activity limits and safety measures with patient and family/Visual Cue: Yellow wristband/Bed in lowest position, wheels locked, appropriate side rails in place/Call bell, personal items and telephone in reach/Instruct patient to call for assistance before getting out of bed or chair/Non-slip footwear when patient is out of bed/[place] to call system/Physically safe environment - no spills, clutter or unnecessary equipment/Purposeful Proactive Rounding/Room/bathroom lighting operational, light cord in reach

## 2023-12-25 NOTE — PATIENT PROFILE ADULT - FUNCTIONAL ASSESSMENT - DAILY ACTIVITY SCORE.
"  [facility]  Adult Nutrition  Consult Note    SUMMARY     Recommendations  1. Recommend TF: Isosource 1.5 @ 45 ml/hr. Provides 1620 kcal ( 97%EEN),  73 gm protein (107% EPN), and 825 ml free water. Fluid flushes per MD. Hold for residuals >500ml.  2. If patient cannot tolerate TF and central line is placed, recommend TPN: Clinimix E 5/15 w/ 20% Lipids @ 65ml/hr to provide 1608 kcal (96% EEN), 78 g pro (114%), and 1560 ml fluid.   3. If central line is not placed, recommend PPN: Clinimix 4.25/10 w/ 20% lipids @ 80 ml/hr to provide 1479 kcal (89% EEN) 81.6 g pro (120% EPN), and 1920 ml fluid.   4. ADAT to Adult Regular   5. RD to follow    Goals: Pt meeting >75% EEN and EPN at RD follow up  Nutrition Goal Status: new  Communication of RD Recs: other (comment) (care plan )    Reason for Assessment  Reason for Assessment: consult (tube feeding vs TPN/PPN recs)  Diagnosis: other (see comments) (AMS)  Relevant Medical History: depression, catatonia, HTN  Interdisciplinary Rounds: did not attend  General Information Comments: Consult recieved for TF/TPN/PPN recommendations. NING patient d/t pt's catatonic state.   Nutrition Discharge Planning: NING    Nutrition Risk Screen  Nutrition Risk Screen: other (see comments) (ning, nonverbal)    Nutrition/Diet History  Patient Reported Diet/Restrictions/Preferences: other (see comments) (NING)  Typical Food/Fluid Intake: NING  Do you have any cultural, spiritual, Gnosticist conflicts, given your current situation?: NING religous/cultural prefs   Factors Affecting Nutritional Intake: depression, other (see comments) (altered mental status)    Anthropometrics  Temp: 98.4 °F (36.9 °C)  Height: 5' 4" (162.6 cm)  Height (inches): 64 in  Weight Method: Bed Scale  Weight: 68 kg (149 lb 14.6 oz)  Weight (lb): 149.91 lb  Ideal Body Weight (IBW), Female: 120 lb  % Ideal Body Weight, Female (lb): 124.92 lb  BMI (Calculated): 25.8  BMI Grade: 18.5-24.9 - normal   "   Lab/Procedures/Meds  Pertinent Labs Reviewed: reviewed  Pertinent Labs Comments: Glu 119  Pertinent Medications Reviewed: reviewed  Pertinent Medications Comments: enoxaparin, IVF    Physical Findings/Assessment  Overall Physical Appearance: other (see comments) (NING)  Oral/Mouth Cavity: WDL  Skin: intact    Estimated/Assessed Needs  Weight Used For Calorie Calculations: 68 kg (149 lb 14.6 oz)  Energy Calorie Requirements (kcal): 1674 (MSJ X 1.2 (PAL) )  Energy Need Method: [place]-St Jeor  Protein Requirements: 54.51-68 g (.8-1.0 gm/kg)  Weight Used For Protein Calculations: 68 kg (149 lb 14.6 oz)  Fluid Requirements (mL): 1 ml/kcal or per MD  Fluid Need Method: RDA Method  RDA Method (mL): 1674    Nutrition Prescription Ordered  Current Diet Order: Clear liquid     Evaluation of Received Nutrient/Fluid Intake  IV Fluid (mL): 3000  % Intake of Estimated Energy Needs: 0 - 25 %  % Meal Intake: 0- 25%     Nutrition Risk  Level of Risk/Frequency of Follow-up: high (f/u 2x/wk)     Assessment and Plan  Nutrition Problem  inadequate energy intake     Related to (etiology):   Decreased appetite 2/2 depression/catatonia     Signs and Symptoms (as evidenced by):   Clear liquid diet, consult received for TF/TPN/PPN    Interventions/Recommendations (treatment strategy):  1. Recommend TF: Isosource 1.5 @ 45 ml/hr. Provides 1620 kcal ( 97%EEN),  73 gm protein (107% EPN), and 825 ml free water. Fluid flushes per MD. Hold for residuals >500ml.  2. If patient cannot tolerate TF and central line is placed, recommend TPN: Clinimix E 5/15 w/ 20% Lipids @ 65ml/hr to provide 1608 kcal (96% EEN), 78 g pro (114%), and 1560 ml fluid.   3. If central line is not placed, recommend PPN: Clinimix 4.25/10 w/ 20% lipids @ 80 ml/hr to provide 1479 kcal (89% EEN) 81.6 g pro (120% EPN), and 1920 ml fluid.   4. ADAT to Adult Regular   5. RD to follow    Nutrition Diagnosis Status:   New    Monitor and Evaluation  Food and Nutrient Intake: energy  intake, food and beverage intake, enteral nutrition intake, parenteral nutrition intake  Food and Nutrient Adminstration: diet order, enteral and parenteral nutrition administration  Physical Activity and Function: nutrition-related ADLs and IADLs  Anthropometric Measurements: weight, weight change  Biochemical Data, Medical Tests and Procedures: electrolyte and renal panel, gastrointestinal profile, inflammatory profile, glucose/endocrine profile, lipid profile  Nutrition-Focused Physical Findings: overall appearance     Nutrition Follow-Up  RD Follow-up?: Yes       22

## 2023-12-25 NOTE — CONSULT NOTE ADULT - SUBJECTIVE AND OBJECTIVE BOX
JACKELINE MOHSEN  [id number]    CC:  Patient is a 69 y/o old  Male who presents with a chief complaint of CP w/SOB assoc with new onset Afib RVR (25 Dec 2023 05:48)      HPI:  69 y/o male with pmhx CABG x 3 2006, hair cell leukemia 2006 (in remission), bladder CA s/p TURP x 2 in remission, HTN, HLD presents with chest pain beginning at midnight==  sudden onset mid-sternal/left sided chest pain associated  with SOB and sweating. Denies ever experiencing this before. Took 325 mg aspirin prior to arrival.  Pt sees Dr. German Umaña cardiologist from [facility]. States last echo and carotid US was 1 yr ago but unsure of results. Quit smoking 50 yrs ago and denies illicit drug use.   First EKG without evidence of acute ischemic changes and troponin was 39. Pt then complained of increased pain and repeat EKG showed AF with diffuse ST depressions with second troponin elevated at 63. Was given sublingual NGT, Brilinta, and Lopressor. Pt then became hypotensive. After discussion with cardiology (Dr. Mauricio), patient was placed on esmolol drip. Repeat EKG showed resolution of prior ischemic changes. Has remained CP free since. Generally very active without any exertional complaints.      ALLERGIES:    No Known Allergies      PAST MEDICAL & SURGICAL HISTORY:  High cholesterol  Hypothyroid  Hypertension  Hairy cell leukemia  S/P CABG x 3  S/P knee surgery    MEDICATIONS:  MEDICATIONS  (STANDING):  aspirin 325 milliGRAM(s) Oral daily  atorvastatin 80 milliGRAM(s) Oral at bedtime  esmolol  Infusion 50 MICROgram(s)/kG/Min (23.5 mL/Hr) IV Continuous <Continuous>  heparin  Infusion. 700 Unit(s)/Hr (7 mL/Hr) IV Continuous <Continuous>  levothyroxine 88 MICROGram(s) Oral daily  metoprolol tartrate 25 milliGRAM(s) Oral every 8 hours    MEDICATIONS  (PRN):  heparin   Injectable 4700 Unit(s) IV Push every 6 hours PRN For aPTT less than 40    aspirin 325 milliGRAM(s) Oral daily  atorvastatin 80 milliGRAM(s) Oral at bedtime  esmolol  Infusion 50 MICROgram(s)/kG/Min IV Continuous <Continuous>  heparin   Injectable 4700 Unit(s) IV Push every 6 hours PRN  heparin  Infusion. 700 Unit(s)/Hr IV Continuous <Continuous>  levothyroxine 88 MICROGram(s) Oral daily  metoprolol tartrate 25 milliGRAM(s) Oral every 8 hours      SOCIAL HISTORY:  Patient denies alcohol, tobacco, drug use    FAMILY HISTORY:  NC    ROS:  Patient denies cough, and other than noted above full ROS is unremarkable      PHYSICAL EXAM:  Vital Signs Last 24 Hrs  T(C): 37.2 (25 Dec 2023 07:19), Max: 37.2 (25 Dec 2023 07:19)  T(F): 98.9 (25 Dec 2023 07:19), Max: 98.9 (25 Dec 2023 07:19)  HR: 58 (25 Dec 2023 13:00) (58 - 142)  BP: 129/70 (25 Dec 2023 13:00) (87/62 - 147/69)  BP(mean): 88 (25 Dec 2023 13:00) (70 - 111)  RR: 15 (25 Dec 2023 13:00) (12 - 33)  SpO2: 95% (25 Dec 2023 13:00) (94% - 98%)    Parameters below as of 25 Dec 2023 13:00  Patient On (Oxygen Delivery Method): room air    General: Patient comfotable in NAD  HEENT: NCAT, mmm, EOMI  Neck: no JVD, no carotid bruits  CVS: nl s1, split s2, no s3, no s4, no murmur or rubs, RRR  Chest: CTA b/l  Abdomen: soft, nt/nd  Extremities: No c/c/e  Neuro: A&O x3  Psych: Normal affect      ECG:    NSR @ 86  BPM with 0.5-1 mm ST abn  AF @129 BPm with diffuse 1=2 mm DST depression c/w ischemia increased from prior      LABS:                        13.5   8.27  )-----------( 156      ( 25 Dec 2023 09:30 )             39.1     12-25    142  |  110<H>  |  19.7  ----------------------------<  133<H>  4.0   |  20.0<L>  |  0.82    Ca    8.3<L>      25 Dec 2023 09:30  Phos  2.6     12-25  Mg     1.9     12-25    TPro  5.5<L>  /  Alb  3.7  /  TBili  0.3<L>  /  DBili  x   /  AST  39  /  ALT  26  /  AlkPhos  78  12-25    CARDIAC MARKERS ( 25 Dec 2023 09:30 )  7.8 ng/mL  80  63    PT/INR - ( 25 Dec 2023 03:51 )   PT: 11.3 sec;   INR: 1.02 ratio         PTT - ( 25 Dec 2023 10:20 )  PTT:54.0 sec      Echo reviewed:  Discrete hypokinesis of the mid anteroseptum and basal inferioor wall  Overall LVEF - 60 %        Assessment:    69 y/o M with hx CABG x 3 2006, hair cell leukemia 2006 (in remission), bladder CA s/p TURP x 2 in remission, HTN, HLD  with anginal chest pain associated  with SOB and sweating.   ECG showed PAF with ischemic ECG changes when pain increased  Troponins have trended +  Echo with small discrete WMA    Plan:  1.Continue Heparin  2. Transition off Esmolol to PO BB  3. Cardiac  TBA - Discussed with Pt and family all of the above, indications, R,B,A and they agree to proceed,       JACKELINE MOHSEN  [id number]    CC:  Patient is a 67 y/o old  Male who presents with a chief complaint of CP w/SOB assoc with new onset Afib RVR (25 Dec 2023 05:48)      HPI:  67 y/o male with pmhx CABG x 3 2006, hair cell leukemia 2006 (in remission), bladder CA s/p TURP x 2 in remission, HTN, HLD presents with chest pain beginning at midnight==  sudden onset mid-sternal/left sided chest pain associated  with SOB and sweating. Denies ever experiencing this before. Took 325 mg aspirin prior to arrival.  Pt sees Dr. German Umaña cardiologist from [facility]. States last echo and carotid US was 1 yr ago but unsure of results. Quit smoking 50 yrs ago and denies illicit drug use.   First EKG without evidence of acute ischemic changes and troponin was 39. Pt then complained of increased pain and repeat EKG showed AF with diffuse ST depressions with second troponin elevated at 63. Was given sublingual NGT, Brilinta, and Lopressor. Pt then became hypotensive. After discussion with cardiology (Dr. Mauricio), patient was placed on esmolol drip. Repeat EKG showed resolution of prior ischemic changes. Has remained CP free since. Generally very active without any exertional complaints.      ALLERGIES:    No Known Allergies      PAST MEDICAL & SURGICAL HISTORY:  High cholesterol  Hypothyroid  Hypertension  Hairy cell leukemia  S/P CABG x 3  S/P knee surgery    MEDICATIONS:  MEDICATIONS  (STANDING):  aspirin 325 milliGRAM(s) Oral daily  atorvastatin 80 milliGRAM(s) Oral at bedtime  esmolol  Infusion 50 MICROgram(s)/kG/Min (23.5 mL/Hr) IV Continuous <Continuous>  heparin  Infusion. 700 Unit(s)/Hr (7 mL/Hr) IV Continuous <Continuous>  levothyroxine 88 MICROGram(s) Oral daily  metoprolol tartrate 25 milliGRAM(s) Oral every 8 hours    MEDICATIONS  (PRN):  heparin   Injectable 4700 Unit(s) IV Push every 6 hours PRN For aPTT less than 40    aspirin 325 milliGRAM(s) Oral daily  atorvastatin 80 milliGRAM(s) Oral at bedtime  esmolol  Infusion 50 MICROgram(s)/kG/Min IV Continuous <Continuous>  heparin   Injectable 4700 Unit(s) IV Push every 6 hours PRN  heparin  Infusion. 700 Unit(s)/Hr IV Continuous <Continuous>  levothyroxine 88 MICROGram(s) Oral daily  metoprolol tartrate 25 milliGRAM(s) Oral every 8 hours      SOCIAL HISTORY:  Patient denies alcohol, tobacco, drug use    FAMILY HISTORY:  NC    ROS:  Patient denies cough, and other than noted above full ROS is unremarkable      PHYSICAL EXAM:  Vital Signs Last 24 Hrs  T(C): 37.2 (25 Dec 2023 07:19), Max: 37.2 (25 Dec 2023 07:19)  T(F): 98.9 (25 Dec 2023 07:19), Max: 98.9 (25 Dec 2023 07:19)  HR: 58 (25 Dec 2023 13:00) (58 - 142)  BP: 129/70 (25 Dec 2023 13:00) (87/62 - 147/69)  BP(mean): 88 (25 Dec 2023 13:00) (70 - 111)  RR: 15 (25 Dec 2023 13:00) (12 - 33)  SpO2: 95% (25 Dec 2023 13:00) (94% - 98%)    Parameters below as of 25 Dec 2023 13:00  Patient On (Oxygen Delivery Method): room air    General: Patient comfotable in NAD  HEENT: NCAT, mmm, EOMI  Neck: no JVD, no carotid bruits  CVS: nl s1, split s2, no s3, no s4, no murmur or rubs, RRR  Chest: CTA b/l  Abdomen: soft, nt/nd  Extremities: No c/c/e  Neuro: A&O x3  Psych: Normal affect      ECG:    NSR @ 86  BPM with 0.5-1 mm ST abn  AF @129 BPm with diffuse 1=2 mm DST depression c/w ischemia increased from prior      LABS:                        13.5   8.27  )-----------( 156      ( 25 Dec 2023 09:30 )             39.1     12-25    142  |  110<H>  |  19.7  ----------------------------<  133<H>  4.0   |  20.0<L>  |  0.82    Ca    8.3<L>      25 Dec 2023 09:30  Phos  2.6     12-25  Mg     1.9     12-25    TPro  5.5<L>  /  Alb  3.7  /  TBili  0.3<L>  /  DBili  x   /  AST  39  /  ALT  26  /  AlkPhos  78  12-25    CARDIAC MARKERS ( 25 Dec 2023 09:30 )  7.8 ng/mL  80  63    PT/INR - ( 25 Dec 2023 03:51 )   PT: 11.3 sec;   INR: 1.02 ratio         PTT - ( 25 Dec 2023 10:20 )  PTT:54.0 sec      Echo reviewed:  Discrete hypokinesis of the mid anteroseptum and basal inferioor wall  Overall LVEF - 60 %        Assessment:    67 y/o M with hx CABG x 3 2006, hair cell leukemia 2006 (in remission), bladder CA s/p TURP x 2 in remission, HTN, HLD  with anginal chest pain associated  with SOB and sweating.   ECG showed PAF with ischemic ECG changes when pain increased  Troponins have trended +  Echo with small discrete WMA    Plan:  1.Continue Heparin  2. Transition off Esmolol to PO BB  3. Cardiac  TBA - Discussed with Pt and family all of the above, indications, R,B,A and they agree to proceed,

## 2023-12-25 NOTE — H&P ADULT - NSICDXPASTMEDICALHX_GEN_ALL_CORE_FT
PAST MEDICAL HISTORY:  Hairy cell leukemia     High cholesterol     Hypertension     Hypothyroid

## 2023-12-25 NOTE — ED PROVIDER NOTE - ATTENDING APP SHARED VISIT CONTRIBUTION OF CARE
69 yo male with pmhx of triple bypass in 2006, hair cell leukemia 2006 (in remission), bladder CA s/p TURP x2 in remission, HTN, HLD presents with CP since midnight. 1st EKG without evidence of acute ischemic changes. 1st trop 39. pt c/o increased CP at time of 2nd trop coming back, 2nd trop elevated 63. rpt EKG with evidence of ischemia, STEMI attending called. agreed ekg likely multivessel dz. Pt also noted to be in new afib with RVR, denies hx of afib. sublingual NTG, Brilinta, and heparin drip/bolus started. After 2nd NTG given, pt became hypotensive. BP improved s/p pressure bag IVF. IV lopressor for afib. discussed with cards again, esmolol started. HR improved. Pt to be admitted to ICU for further management and likely cardiac catheterization.     upon my evaluation, this patient had a high probability of imminent or life-threatening deterioration due to  NSTEMI, A-fib RVR, HTN which required my direct attention, intervention, and personal management.  The patient has a  medical condition that impairs one or more vital organ systems.  Frequent personal assessment and adjustment of medical interventions was performed.      I have personally provided 45 minutes of critical care time exclusive of time spent on separately billable procedures. Time includes review of laboratory data, radiology results, discussion with consultants, patient and family; monitoring for potential decompensation, as well as time spent retrieving data and reviewing the chart and documenting the visit. Interventions were performed as documented above.

## 2023-12-25 NOTE — ED ADULT NURSE REASSESSMENT NOTE - NS ED NURSE REASSESS COMMENT FT1
pt states increasing chest pain radiating down his arms. MARINO Rai made aware. MD Vargas at bedside. Rpt EKG performed showing EKG change. Lab called RN with critical value, trop 63. Pt moved to critical care. pt breathing heavy but unlabored. Nitro given.

## 2023-12-26 ENCOUNTER — TRANSCRIPTION ENCOUNTER (OUTPATIENT)
Age: 68
End: 2023-12-26

## 2023-12-26 VITALS
RESPIRATION RATE: 18 BRPM | TEMPERATURE: 98 F | OXYGEN SATURATION: 97 % | SYSTOLIC BLOOD PRESSURE: 147 MMHG | HEART RATE: 61 BPM | DIASTOLIC BLOOD PRESSURE: 64 MMHG

## 2023-12-26 LAB
A1C WITH ESTIMATED AVERAGE GLUCOSE RESULT: 5.3 % — SIGNIFICANT CHANGE UP (ref 4–5.6)
A1C WITH ESTIMATED AVERAGE GLUCOSE RESULT: 5.3 % — SIGNIFICANT CHANGE UP (ref 4–5.6)
ALBUMIN SERPL ELPH-MCNC: 3.5 G/DL — SIGNIFICANT CHANGE UP (ref 3.3–5.2)
ALBUMIN SERPL ELPH-MCNC: 3.5 G/DL — SIGNIFICANT CHANGE UP (ref 3.3–5.2)
ALP SERPL-CCNC: 68 U/L — SIGNIFICANT CHANGE UP (ref 40–120)
ALP SERPL-CCNC: 68 U/L — SIGNIFICANT CHANGE UP (ref 40–120)
ALT FLD-CCNC: 23 U/L — SIGNIFICANT CHANGE UP
ALT FLD-CCNC: 23 U/L — SIGNIFICANT CHANGE UP
ANION GAP SERPL CALC-SCNC: 9 MMOL/L — SIGNIFICANT CHANGE UP (ref 5–17)
ANION GAP SERPL CALC-SCNC: 9 MMOL/L — SIGNIFICANT CHANGE UP (ref 5–17)
APTT BLD: 67.7 SEC — HIGH (ref 24.5–35.6)
APTT BLD: 67.7 SEC — HIGH (ref 24.5–35.6)
AST SERPL-CCNC: 27 U/L — SIGNIFICANT CHANGE UP
AST SERPL-CCNC: 27 U/L — SIGNIFICANT CHANGE UP
BASOPHILS # BLD AUTO: 0.06 K/UL — SIGNIFICANT CHANGE UP (ref 0–0.2)
BASOPHILS # BLD AUTO: 0.06 K/UL — SIGNIFICANT CHANGE UP (ref 0–0.2)
BASOPHILS NFR BLD AUTO: 1 % — SIGNIFICANT CHANGE UP (ref 0–2)
BASOPHILS NFR BLD AUTO: 1 % — SIGNIFICANT CHANGE UP (ref 0–2)
BILIRUB SERPL-MCNC: 0.4 MG/DL — SIGNIFICANT CHANGE UP (ref 0.4–2)
BILIRUB SERPL-MCNC: 0.4 MG/DL — SIGNIFICANT CHANGE UP (ref 0.4–2)
BUN SERPL-MCNC: 15.7 MG/DL — SIGNIFICANT CHANGE UP (ref 8–20)
BUN SERPL-MCNC: 15.7 MG/DL — SIGNIFICANT CHANGE UP (ref 8–20)
CALCIUM SERPL-MCNC: 8.5 MG/DL — SIGNIFICANT CHANGE UP (ref 8.4–10.5)
CALCIUM SERPL-MCNC: 8.5 MG/DL — SIGNIFICANT CHANGE UP (ref 8.4–10.5)
CHLORIDE SERPL-SCNC: 110 MMOL/L — HIGH (ref 96–108)
CHLORIDE SERPL-SCNC: 110 MMOL/L — HIGH (ref 96–108)
CO2 SERPL-SCNC: 23 MMOL/L — SIGNIFICANT CHANGE UP (ref 22–29)
CO2 SERPL-SCNC: 23 MMOL/L — SIGNIFICANT CHANGE UP (ref 22–29)
CREAT SERPL-MCNC: 0.87 MG/DL — SIGNIFICANT CHANGE UP (ref 0.5–1.3)
CREAT SERPL-MCNC: 0.87 MG/DL — SIGNIFICANT CHANGE UP (ref 0.5–1.3)
EGFR: 93 ML/MIN/1.73M2 — SIGNIFICANT CHANGE UP
EGFR: 93 ML/MIN/1.73M2 — SIGNIFICANT CHANGE UP
EOSINOPHIL # BLD AUTO: 0.38 K/UL — SIGNIFICANT CHANGE UP (ref 0–0.5)
EOSINOPHIL # BLD AUTO: 0.38 K/UL — SIGNIFICANT CHANGE UP (ref 0–0.5)
EOSINOPHIL NFR BLD AUTO: 6.1 % — HIGH (ref 0–6)
EOSINOPHIL NFR BLD AUTO: 6.1 % — HIGH (ref 0–6)
ESTIMATED AVERAGE GLUCOSE: 105 MG/DL — SIGNIFICANT CHANGE UP (ref 68–114)
ESTIMATED AVERAGE GLUCOSE: 105 MG/DL — SIGNIFICANT CHANGE UP (ref 68–114)
GLUCOSE SERPL-MCNC: 111 MG/DL — HIGH (ref 70–99)
GLUCOSE SERPL-MCNC: 111 MG/DL — HIGH (ref 70–99)
HCT VFR BLD CALC: 43.5 % — SIGNIFICANT CHANGE UP (ref 39–50)
HCT VFR BLD CALC: 43.5 % — SIGNIFICANT CHANGE UP (ref 39–50)
HGB BLD-MCNC: 14.3 G/DL — SIGNIFICANT CHANGE UP (ref 13–17)
HGB BLD-MCNC: 14.3 G/DL — SIGNIFICANT CHANGE UP (ref 13–17)
IMM GRANULOCYTES NFR BLD AUTO: 0.3 % — SIGNIFICANT CHANGE UP (ref 0–0.9)
IMM GRANULOCYTES NFR BLD AUTO: 0.3 % — SIGNIFICANT CHANGE UP (ref 0–0.9)
LYMPHOCYTES # BLD AUTO: 2.66 K/UL — SIGNIFICANT CHANGE UP (ref 1–3.3)
LYMPHOCYTES # BLD AUTO: 2.66 K/UL — SIGNIFICANT CHANGE UP (ref 1–3.3)
LYMPHOCYTES # BLD AUTO: 43 % — SIGNIFICANT CHANGE UP (ref 13–44)
LYMPHOCYTES # BLD AUTO: 43 % — SIGNIFICANT CHANGE UP (ref 13–44)
MAGNESIUM SERPL-MCNC: 2.1 MG/DL — SIGNIFICANT CHANGE UP (ref 1.6–2.6)
MAGNESIUM SERPL-MCNC: 2.1 MG/DL — SIGNIFICANT CHANGE UP (ref 1.6–2.6)
MCHC RBC-ENTMCNC: 30.4 PG — SIGNIFICANT CHANGE UP (ref 27–34)
MCHC RBC-ENTMCNC: 30.4 PG — SIGNIFICANT CHANGE UP (ref 27–34)
MCHC RBC-ENTMCNC: 32.9 GM/DL — SIGNIFICANT CHANGE UP (ref 32–36)
MCHC RBC-ENTMCNC: 32.9 GM/DL — SIGNIFICANT CHANGE UP (ref 32–36)
MCV RBC AUTO: 92.4 FL — SIGNIFICANT CHANGE UP (ref 80–100)
MCV RBC AUTO: 92.4 FL — SIGNIFICANT CHANGE UP (ref 80–100)
MONOCYTES # BLD AUTO: 0.53 K/UL — SIGNIFICANT CHANGE UP (ref 0–0.9)
MONOCYTES # BLD AUTO: 0.53 K/UL — SIGNIFICANT CHANGE UP (ref 0–0.9)
MONOCYTES NFR BLD AUTO: 8.6 % — SIGNIFICANT CHANGE UP (ref 2–14)
MONOCYTES NFR BLD AUTO: 8.6 % — SIGNIFICANT CHANGE UP (ref 2–14)
NEUTROPHILS # BLD AUTO: 2.54 K/UL — SIGNIFICANT CHANGE UP (ref 1.8–7.4)
NEUTROPHILS # BLD AUTO: 2.54 K/UL — SIGNIFICANT CHANGE UP (ref 1.8–7.4)
NEUTROPHILS NFR BLD AUTO: 41 % — LOW (ref 43–77)
NEUTROPHILS NFR BLD AUTO: 41 % — LOW (ref 43–77)
PHOSPHATE SERPL-MCNC: 2.1 MG/DL — LOW (ref 2.4–4.7)
PHOSPHATE SERPL-MCNC: 2.1 MG/DL — LOW (ref 2.4–4.7)
PLATELET # BLD AUTO: 152 K/UL — SIGNIFICANT CHANGE UP (ref 150–400)
PLATELET # BLD AUTO: 152 K/UL — SIGNIFICANT CHANGE UP (ref 150–400)
POTASSIUM SERPL-MCNC: 4.8 MMOL/L — SIGNIFICANT CHANGE UP (ref 3.5–5.3)
POTASSIUM SERPL-MCNC: 4.8 MMOL/L — SIGNIFICANT CHANGE UP (ref 3.5–5.3)
POTASSIUM SERPL-SCNC: 4.8 MMOL/L — SIGNIFICANT CHANGE UP (ref 3.5–5.3)
POTASSIUM SERPL-SCNC: 4.8 MMOL/L — SIGNIFICANT CHANGE UP (ref 3.5–5.3)
PROT SERPL-MCNC: 5.3 G/DL — LOW (ref 6.6–8.7)
PROT SERPL-MCNC: 5.3 G/DL — LOW (ref 6.6–8.7)
RBC # BLD: 4.71 M/UL — SIGNIFICANT CHANGE UP (ref 4.2–5.8)
RBC # BLD: 4.71 M/UL — SIGNIFICANT CHANGE UP (ref 4.2–5.8)
RBC # FLD: 13.2 % — SIGNIFICANT CHANGE UP (ref 10.3–14.5)
RBC # FLD: 13.2 % — SIGNIFICANT CHANGE UP (ref 10.3–14.5)
SODIUM SERPL-SCNC: 142 MMOL/L — SIGNIFICANT CHANGE UP (ref 135–145)
SODIUM SERPL-SCNC: 142 MMOL/L — SIGNIFICANT CHANGE UP (ref 135–145)
TROPONIN T, HIGH SENSITIVITY RESULT: 25 NG/L — SIGNIFICANT CHANGE UP (ref 0–51)
TROPONIN T, HIGH SENSITIVITY RESULT: 25 NG/L — SIGNIFICANT CHANGE UP (ref 0–51)
WBC # BLD: 6.19 K/UL — SIGNIFICANT CHANGE UP (ref 3.8–10.5)
WBC # BLD: 6.19 K/UL — SIGNIFICANT CHANGE UP (ref 3.8–10.5)
WBC # FLD AUTO: 6.19 K/UL — SIGNIFICANT CHANGE UP (ref 3.8–10.5)
WBC # FLD AUTO: 6.19 K/UL — SIGNIFICANT CHANGE UP (ref 3.8–10.5)

## 2023-12-26 PROCEDURE — 93010 ELECTROCARDIOGRAM REPORT: CPT

## 2023-12-26 PROCEDURE — 84443 ASSAY THYROID STIM HORMONE: CPT

## 2023-12-26 PROCEDURE — 85025 COMPLETE CBC W/AUTO DIFF WBC: CPT

## 2023-12-26 PROCEDURE — 99239 HOSP IP/OBS DSCHRG MGMT >30: CPT

## 2023-12-26 PROCEDURE — 85610 PROTHROMBIN TIME: CPT

## 2023-12-26 PROCEDURE — 86900 BLOOD TYPING SEROLOGIC ABO: CPT

## 2023-12-26 PROCEDURE — 84484 ASSAY OF TROPONIN QUANT: CPT

## 2023-12-26 PROCEDURE — 83735 ASSAY OF MAGNESIUM: CPT

## 2023-12-26 PROCEDURE — C1874: CPT

## 2023-12-26 PROCEDURE — 96375 TX/PRO/DX INJ NEW DRUG ADDON: CPT

## 2023-12-26 PROCEDURE — 84100 ASSAY OF PHOSPHORUS: CPT

## 2023-12-26 PROCEDURE — 86803 HEPATITIS C AB TEST: CPT

## 2023-12-26 PROCEDURE — 84480 ASSAY TRIIODOTHYRONINE (T3): CPT

## 2023-12-26 PROCEDURE — 99285 EMERGENCY DEPT VISIT HI MDM: CPT

## 2023-12-26 PROCEDURE — 93005 ELECTROCARDIOGRAM TRACING: CPT

## 2023-12-26 PROCEDURE — 83880 ASSAY OF NATRIURETIC PEPTIDE: CPT

## 2023-12-26 PROCEDURE — 83036 HEMOGLOBIN GLYCOSYLATED A1C: CPT

## 2023-12-26 PROCEDURE — C1894: CPT

## 2023-12-26 PROCEDURE — 85027 COMPLETE CBC AUTOMATED: CPT

## 2023-12-26 PROCEDURE — 85730 THROMBOPLASTIN TIME PARTIAL: CPT

## 2023-12-26 PROCEDURE — 80053 COMPREHEN METABOLIC PANEL: CPT

## 2023-12-26 PROCEDURE — 84436 ASSAY OF TOTAL THYROXINE: CPT

## 2023-12-26 PROCEDURE — C1887: CPT

## 2023-12-26 PROCEDURE — 96374 THER/PROPH/DIAG INJ IV PUSH: CPT

## 2023-12-26 PROCEDURE — C1760: CPT

## 2023-12-26 PROCEDURE — 82553 CREATINE MB FRACTION: CPT

## 2023-12-26 PROCEDURE — 36415 COLL VENOUS BLD VENIPUNCTURE: CPT

## 2023-12-26 PROCEDURE — 86901 BLOOD TYPING SEROLOGIC RH(D): CPT

## 2023-12-26 PROCEDURE — C1769: CPT

## 2023-12-26 PROCEDURE — 86850 RBC ANTIBODY SCREEN: CPT

## 2023-12-26 PROCEDURE — C9600: CPT | Mod: LC

## 2023-12-26 PROCEDURE — 93306 TTE W/DOPPLER COMPLETE: CPT

## 2023-12-26 PROCEDURE — 71045 X-RAY EXAM CHEST 1 VIEW: CPT

## 2023-12-26 PROCEDURE — 93459 L HRT ART/GRFT ANGIO: CPT | Mod: 59

## 2023-12-26 PROCEDURE — 80061 LIPID PANEL: CPT

## 2023-12-26 PROCEDURE — C1725: CPT

## 2023-12-26 PROCEDURE — 82550 ASSAY OF CK (CPK): CPT

## 2023-12-26 PROCEDURE — 80048 BASIC METABOLIC PNL TOTAL CA: CPT

## 2023-12-26 RX ORDER — SODIUM CHLORIDE 9 MG/ML
250 INJECTION INTRAMUSCULAR; INTRAVENOUS; SUBCUTANEOUS ONCE
Refills: 0 | Status: COMPLETED | OUTPATIENT
Start: 2023-12-26 | End: 2023-12-26

## 2023-12-26 RX ORDER — ASPIRIN/CALCIUM CARB/MAGNESIUM 324 MG
1 TABLET ORAL
Qty: 30 | Refills: 0
Start: 2023-12-26 | End: 2024-01-24

## 2023-12-26 RX ORDER — APIXABAN 2.5 MG/1
5 TABLET, FILM COATED ORAL
Refills: 0 | Status: DISCONTINUED | OUTPATIENT
Start: 2023-12-26 | End: 2023-12-26

## 2023-12-26 RX ORDER — CLOPIDOGREL BISULFATE 75 MG/1
75 TABLET, FILM COATED ORAL DAILY
Refills: 0 | Status: DISCONTINUED | OUTPATIENT
Start: 2023-12-26 | End: 2023-12-26

## 2023-12-26 RX ORDER — ROSUVASTATIN CALCIUM 5 MG/1
1 TABLET ORAL
Qty: 30 | Refills: 0
Start: 2023-12-26 | End: 2024-01-24

## 2023-12-26 RX ORDER — ROSUVASTATIN CALCIUM 5 MG/1
1 TABLET ORAL
Refills: 0 | DISCHARGE

## 2023-12-26 RX ORDER — LEVOTHYROXINE SODIUM 125 MCG
1 TABLET ORAL
Refills: 0 | DISCHARGE

## 2023-12-26 RX ORDER — CLOPIDOGREL BISULFATE 75 MG/1
1 TABLET, FILM COATED ORAL
Qty: 30 | Refills: 0
Start: 2023-12-26 | End: 2024-01-24

## 2023-12-26 RX ORDER — ATORVASTATIN CALCIUM 80 MG/1
1 TABLET, FILM COATED ORAL
Qty: 30 | Refills: 0
Start: 2023-12-26 | End: 2024-01-24

## 2023-12-26 RX ORDER — PANTOPRAZOLE SODIUM 20 MG/1
1 TABLET, DELAYED RELEASE ORAL
Qty: 30 | Refills: 0
Start: 2023-12-26 | End: 2024-01-24

## 2023-12-26 RX ORDER — SODIUM,POTASSIUM PHOSPHATES 278-250MG
1 POWDER IN PACKET (EA) ORAL ONCE
Refills: 0 | Status: COMPLETED | OUTPATIENT
Start: 2023-12-26 | End: 2023-12-26

## 2023-12-26 RX ORDER — APIXABAN 2.5 MG/1
1 TABLET, FILM COATED ORAL
Qty: 60 | Refills: 0
Start: 2023-12-26 | End: 2024-01-24

## 2023-12-26 RX ORDER — FAMOTIDINE 10 MG/ML
20 INJECTION INTRAVENOUS DAILY
Refills: 0 | Status: DISCONTINUED | OUTPATIENT
Start: 2023-12-26 | End: 2023-12-26

## 2023-12-26 RX ORDER — METOPROLOL TARTRATE 50 MG
1 TABLET ORAL
Refills: 0 | DISCHARGE

## 2023-12-26 RX ORDER — ASPIRIN/CALCIUM CARB/MAGNESIUM 324 MG
81 TABLET ORAL DAILY
Refills: 0 | Status: DISCONTINUED | OUTPATIENT
Start: 2023-12-27 | End: 2023-12-26

## 2023-12-26 RX ADMIN — Medication 88 MICROGRAM(S): at 05:11

## 2023-12-26 RX ADMIN — Medication 325 MILLIGRAM(S): at 08:42

## 2023-12-26 RX ADMIN — APIXABAN 5 MILLIGRAM(S): 2.5 TABLET, FILM COATED ORAL at 18:05

## 2023-12-26 RX ADMIN — SODIUM CHLORIDE 500 MILLILITER(S): 9 INJECTION INTRAMUSCULAR; INTRAVENOUS; SUBCUTANEOUS at 11:16

## 2023-12-26 RX ADMIN — Medication 1 PACKET(S): at 18:05

## 2023-12-26 RX ADMIN — HEPARIN SODIUM 900 UNIT(S)/HR: 5000 INJECTION INTRAVENOUS; SUBCUTANEOUS at 07:16

## 2023-12-26 RX ADMIN — HEPARIN SODIUM 900 UNIT(S)/HR: 5000 INJECTION INTRAVENOUS; SUBCUTANEOUS at 05:12

## 2023-12-26 RX ADMIN — HEPARIN SODIUM 900 UNIT(S)/HR: 5000 INJECTION INTRAVENOUS; SUBCUTANEOUS at 00:14

## 2023-12-26 RX ADMIN — SODIUM CHLORIDE 500 MILLILITER(S): 9 INJECTION INTRAMUSCULAR; INTRAVENOUS; SUBCUTANEOUS at 08:56

## 2023-12-26 NOTE — PROGRESS NOTE ADULT - ASSESSMENT
69 yo male former smoker HX HTN HLD CABG 2006, + Troponin S.      Plan for LHC Via RFA  asa   hydration prior to Cath   meds reviewed

## 2023-12-26 NOTE — DISCHARGE NOTE PROVIDER - NSDCCPTREATMENT_GEN_ALL_CORE_FT
PRINCIPAL PROCEDURE  Procedure: Placement of stent for CAD  Findings and Treatment: Restricted use with no heavy lifting of affected arm for 48 hours.  No submerging the arm in water for 48 hours.  You may start showering today.  Call your doctor for any bleeding, swelling, loss of sensation in the hand or fingers, or fingers turning blue.  If heavy bleeding or large lumps form, hold pressure at the spot and come to the Emergency Room.

## 2023-12-26 NOTE — DISCHARGE NOTE PROVIDER - HOSPITAL COURSE
68M with PMHx of triple bypass 2006, hair cell laukemia 2006 (in remission), bladder CA s/p TURP x2 in remission. HTN, HLD, presented w/ CP with SOB. Pt follow outpatient cardiologist Dr. German Umaña. Pt reports alcohol. Found in new onset of Afib w/ rvr and hypotensive. Admitted to MICU for new onset of Afib w/ rvr requiring Esmolol drip, downgraded to medicine. Patient now s/p LHC via RFA angioseal and found severe native 2 vessel CAD and benign SHIVA SVG to OM1 without any complications. Pt will be discharged home with Eliquis 5mg po bid and dual antiplatelet ASA 81mg and Plavix 75mg po qd and PPI for 30 days and then Plavix and Eliquis alone to complete a full year, following with ASA and Eliquis thereafter. Pt to continue BB and started on ACEi. Pt will followup outpatient cardiology [facility] with Lore Umaña and Darryl 2 weeks. Pt advised for groin precaution. Patient is medically optimized and hemodynamically stable to be discharged with appropriate follow ups. 68M with PMHx of triple bypass 2006, hair cell laukemia 2006 (in remission), bladder CA s/p TURP x2 in remission. HTN, HLD, presented w/ CP with SOB. Pt follow outpatient cardiologist Dr. German Umaña. Pt reports alcohol. Found in new onset of Afib w/ rvr and hypotensive. Admitted to MICU for new onset of Afib w/ rvr requiring Esmolol drip, downgraded to medicine. Patient now s/p LHC via RFA angioseal and found severe native 2 vessel CAD and benign SHIVA SVG to OM1 without any complications. Pt will be discharged home with Eliquis 5mg po bid and dual antiplatelet ASA 81mg and Plavix 75mg po qd and PPI for 30 days and then Plavix and Eliquis alone to complete a full year, following with ASA and Eliquis thereafter. Pt to continue BB and started on ACEi. Pt will followup outpatient cardiology [facility] with Lore Umaña and Darryl 2 weeks. Pt advised for groin precaution. Patient is medically optimized and hemodynamically stable to be discharged with appropriate follow ups.

## 2023-12-26 NOTE — DISCHARGE NOTE PROVIDER - NSDCFUADDAPPT_GEN_ALL_CORE_FT
Please see below for post hospital follow up information     1. [facility]O Meds To Bed: [phone]    2. Home Care:PATIENT DECLINED    3. Transitional Care Services: [phone]    4, A. Primary Care Appointment:    4, B. Specialty Appointment:    5. STAR Education Packet Provided  Please see below for post hospital follow up information     1. VIVO Meds To Bed: [phone]    2. Home Care:PATIENT DECLINED    3. Transitional Care Services: [phone]    4, A. Primary Care Appointment:    4, B. Specialty Appointment:    5. STAR Education Packet Provided

## 2023-12-26 NOTE — DISCHARGE NOTE PROVIDER - NSDCCPCAREPLAN_GEN_ALL_CORE_FT
PRINCIPAL DISCHARGE DIAGNOSIS  Diagnosis: NSTEMI (non-ST elevation myocardial infarction)  Assessment and Plan of Treatment: Do not stop Aspirin or Plavix without speaking with cardiologist first.         SECONDARY DISCHARGE DIAGNOSES  Diagnosis: Atrial fibrillation with RVR  Assessment and Plan of Treatment: eliquis for 30 days post hospitalist     PRINCIPAL DISCHARGE DIAGNOSIS  Diagnosis: NSTEMI (non-ST elevation myocardial infarction)  Assessment and Plan of Treatment: s/p LHC via RFA angioseal benign SHIVA SVG to OM1    -c/w aspirin to 81 mg po QD  -Eliquis po 2 x day for at least 30 day   -plavix 75 mg po qd   -groin precautions  -continue current medical therapy (crestor, 20mg po qd altace metoprolol synthroid)  -Dual anti platelet therapy with aspirin/ plavix   -follow up outpt in 2 weeks with Cardiologist as out patient to discuss management of afib   Do not stop Aspirin or Plavix without speaking with cardiologist first.         SECONDARY DISCHARGE DIAGNOSES  Diagnosis: Atrial fibrillation with RVR  Assessment and Plan of Treatment: eliquis for 30 days post hospitalist  HR controleed with BB

## 2023-12-26 NOTE — DISCHARGE NOTE PROVIDER - PROVIDER TOKENS
PROVIDER:[TOKEN:[[id number]]] PROVIDER:[TOKEN:[[id number]]] PROVIDER:[TOKEN:[[id number]],FOLLOWUP:[2 weeks]] PROVIDER:[TOKEN:[[id number]],FOLLOWUP:[2 weeks]]

## 2023-12-26 NOTE — DISCHARGE NOTE NURSING/CASE MANAGEMENT/SOCIAL WORK - NSDCFUADDAPPT_GEN_ALL_CORE_FT
Please see below for post hospital follow up information     1. [facility]O Meds To Bed: [phone]    2. Home Care:PATIENT DECLINED    3. Transitional Care Services: [phone]    4, A. Primary Care Appointment:    4, B. Specialty Appointment:    5. STAR Education Packet Provided  Please see below for post hospital follow up information     1. [facility]O Meds To Bed: [phone]    2. Home Care:PATIENT DECLINED    3. Transitional Care Services: [phone]    4, A. Primary Care Appointment:    4, B. Specialty Appointment:    5. STAR Education Packet Provided  Please see below for post hospital follow up information     1. [facility]IVO Meds To Bed: [phone]    2. Home Care:PATIENT DECLINED    3. Transitional Care Services: [phone]    4, A. Primary Care Appointment:Pcp-Appointment made with  Dr. Valentine    on  1/16  at  9:15. If you are unable to attend your pre-scheduled appointment, please contact the office directly at [phone]  at  [address] [facility] to reschedule.    4, B. Specialty Appointment:Cardio -Appointment made with   on  1/10  at  8:15. If you are unable to attend your pre-scheduled appointment, please contact the office directly at [phone] at  [address] [facility]  to reschedule.     5. STAR Education Packet Provided  Please see below for post hospital follow up information     1. VIVO Meds To Bed: [phone]    2. Home Care:PATIENT DECLINED    3. Transitional Care Services: [phone]    4, A. Primary Care Appointment:Pcp-Appointment made with  Dr. Valentine    on  1/16  at  9:15. If you are unable to attend your pre-scheduled appointment, please contact the office directly at [phone]  at  [address] [facility] to reschedule.    4, B. Specialty Appointment:Cardio -Appointment made with   on  1/10  at  8:15. If you are unable to attend your pre-scheduled appointment, please contact the office directly at [phone] at  [address] [facility]  to reschedule.     5. STAR Education Packet Provided

## 2023-12-26 NOTE — DISCHARGE NOTE PROVIDER - NSDCMRMEDTOKEN_GEN_ALL_CORE_FT
Altace 10 mg oral capsule: 1 cap(s) orally once a day  aspirin 325 mg oral tablet: 1 tab(s) orally once a day  Crestor 10 mg oral tablet: 1 tab(s) orally once a day (at bedtime)  Fish Oil 1200 mg oral capsule: 1 cap(s) orally 3 times a day  Naprosyn 500 mg oral tablet: 1 tab(s) orally 2 times a day  Synthroid 75 mcg (0.075 mg) oral tablet: 1 tab(s) orally once a day  Toprol-XL 50 mg oral tablet, extended release: 0.5 tab(s) orally once a day   Altace 10 mg oral capsule: 1 cap(s) orally once a day  apixaban 5 mg oral tablet: 1 tab(s) orally 2 times a day  aspirin 81 mg oral capsule: 1 cap(s) orally once a day  atorvastatin 80 mg oral tablet: 1 tab(s) orally once a day (at bedtime)  Fish Oil 1200 mg oral capsule: 1 cap(s) orally 3 times a day  pantoprazole 20 mg oral delayed release tablet: 1 tab(s) orally once a day  Plavix 75 mg oral tablet: 1 tab(s) orally once a day  Synthroid 75 mcg (0.075 mg) oral tablet: 1 tab(s) orally once a day  Toprol-XL 50 mg oral tablet, extended release: 0.5 tab(s) orally once a day   Altace 10 mg oral capsule: 1 cap(s) orally once a day  apixaban 5 mg oral tablet: 1 tab(s) orally 2 times a day  aspirin 81 mg oral capsule: 1 cap(s) orally once a day  Crestor 10 mg oral tablet: 1 tab(s) orally once a day (at bedtime)  Fish Oil 1200 mg oral capsule: 1 cap(s) orally 3 times a day  pantoprazole 20 mg oral delayed release tablet: 1 tab(s) orally once a day  Plavix 75 mg oral tablet: 1 tab(s) orally once a day  Synthroid 75 mcg (0.075 mg) oral tablet: 1 tab(s) orally once a day  Toprol-XL 50 mg oral tablet, extended release: 0.5 tab(s) orally once a day   Altace 10 mg oral capsule: 1 cap(s) orally once a day  apixaban 5 mg oral tablet: 1 tab(s) orally 2 times a day  aspirin 81 mg oral capsule: 1 cap(s) orally once a day  Crestor 20 mg oral tablet: 1 tab(s) orally once a day (at bedtime)  Fish Oil 1200 mg oral capsule: 1 cap(s) orally 3 times a day  pantoprazole 20 mg oral delayed release tablet: 1 tab(s) orally once a day  Plavix 75 mg oral tablet: 1 tab(s) orally once a day  Synthroid 88 mcg (0.088 mg) oral tablet: 1 tab(s) orally once a day  Toprol-XL 25 mg oral tablet, extended release: 1 tab(s) orally once a day

## 2023-12-26 NOTE — DISCHARGE NOTE PROVIDER - ATTENDING DISCHARGE PHYSICAL EXAMINATION:
Vital Signs Last 24 Hrs  T(C): 36.6 (26 Dec 2023 13:55), Max: 36.9 (25 Dec 2023 19:35)  T(F): 97.9 (26 Dec 2023 13:55), Max: 98.4 (25 Dec 2023 19:35)  HR: 53 (26 Dec 2023 13:55) (51 - 68)  BP: 152/78 (26 Dec 2023 13:55) (106/61 - 183/80)  BP(mean): 82 (25 Dec 2023 22:35) (78 - 94)  RR: 18 (26 Dec 2023 13:55) (15 - 19)  SpO2: 95% (26 Dec 2023 13:55) (93% - 97%)    Parameters below as of 26 Dec 2023 13:55  Patient On (Oxygen Delivery Method): room air    PHYSICAL EXAM:    GENERAL: NAD, alert and interactive, comfortable   CHEST/LUNG: Clear to auscultation bilaterally, A/P; respirations unlabored on RA   HEART: S1S2+, Regular rate and rhythm, no m/r/g.   ABDOMEN: Soft, Nontender, Nondistended; Bowel sounds present  SKIN: warm, dry  NEURO: AAOX3, grossly non-focal

## 2023-12-26 NOTE — DISCHARGE NOTE NURSING/CASE MANAGEMENT/SOCIAL WORK - NSDCPEFALRISK_GEN_ALL_CORE
For information on Fall & Injury Prevention, visit: [email or web address] OR  [email or web address] OR  https://www.cdc.gov/steadi/patient.html For information on Fall & Injury Prevention, visit: [email or web address] OR  [email or web address] OR  https://www.cdc.gov/steadi/patient.html

## 2023-12-26 NOTE — PROGRESS NOTE ADULT - SUBJECTIVE AND OBJECTIVE BOX
Department of Cardiology                                                                  [facility]/[place] [address] [address] [facility]                                                            Telephone: [phone]. Fax:[phone]                                                                             Interventional Cardiology Consult/Pre/Post-Cath Progress Note      Interventional Cardiology consulting/following for CP elevated Trop  Update: No acute events overnight, NAD and HDS.   Plan/Indication: Significant risk factors (CABG 3 V ) now for [facility]   No concerns or contraidnications to proceeding as planned      Symptoms:        Angina (Class): CCS III       Ischemic Symptoms: CP     Assessment of LVEF:       EF: 60%       Assessed by: Study Information: Image quality for this study is adequate.    _______________________________________________________________________________________     CONCLUSIONS:      1. Left ventricular cavity is small. Left ventricular systolic function is normal with an ejection fraction visually estimated at 60 to 65 %.   2. Mid anteroseptal segment and basal inferior segment are abnormal.   3. Normal left ventricular diastolic function, with normal filling pressure.   4. Normal right ventricular cavity size and systolic function.   5. The left atrium is normal.   6. The right atrium is normal in size.  7. Fibrocalcific aortic valve sclerosis without stenosis.   8. Mild mitral regurgitation.   9. Mild tricuspid regurgitation.  10. Estimated pulmonary artery systolic pressure is 37 mmHg.  11. Aortic root at the sinuses of Valsalva is normal in size, measuring 3.46 cm (indexed 1.75 cm/m²). Ascending aorta diameter is normal in size, measuring 3.24 cm (indexed 1.64 cm/m²).  12. No pericardial effusion seen.         Date: 12/25/23        Risk Assessments:  ASA: 2  Mallampati: 3  Bleeding Risk: 1.5  Creatinine: 0.86  GFR:     Associated Risk Factors:        Frailty Assessment (none/mild/mod/severe):       Cerebrovascular Disease: N/A       Chronic Lung Disease: N/A       Peripheral Arterial Disease: N/A       Chronic Kidney Disease (if yes, what is GFR): N/A       Uncontrolled Diabetes (if yes, what is HgbA1C or FBS): N/A       Poorly Controlled Hypertension (if yes, what is SBP): N/A       Morbid Obesity (if yes, what is BMI): N/A       History of Recent Ventricular Arrhythmia: N/A       Inability to Ambulate Safely: N/A       Need for Therapeutic Anticoagulation: N/A       Antiplatelet or Contrast Allergy: N/A    Antianginal Therapies:        Beta Blockers:         Calcium Channel Blockers:        Long Acting Nitrates:        Ranexa:     	  MEDICATIONS:  metoprolol tartrate 25 milliGRAM(s) Oral every 12 hours        aspirin 325 milliGRAM(s) Oral daily      atorvastatin 80 milliGRAM(s) Oral at bedtime  levothyroxine 88 MICROGram(s) Oral daily  heparin   Injectable 4700 Unit(s) IV Push every 6 hours PRN  heparin  Infusion. 700 Unit(s)/Hr IV Continuous <Continuous>  potassium phosphate / sodium phosphate Powder (PHOS-NaK) 1 Packet(s) Oral once        ROS: as stated above, otherwise negative    PHYSICAL EXAM:  Constitutional: A & O x 3  HEENT:   Normal oral mucosa, PERRL, EOMI	  Cardiovascular: Normal S1 S2, No JVD, No murmurs  Respiratory: Lungs clear to auscultation	  Gastrointestinal:  Soft, Non-tender, + BS	  Skin: No rashes, No ecchymoses, No cyanosis  Neurologic: Non-focal  Extremities: Normal range of motion, no edema  Vascular: Peripheral pulses palpable + bilaterally      T(C): 36.7 (12-26-23 @ 08:46), Max: 36.9 (12-25-23 @ 19:35)  HR: 54 (12-26-23 @ 08:46) (54 - 86)  BP: 183/80 (12-26-23 @ 08:46) (106/61 - 183/80)  RR: 16 (12-26-23 @ 08:46) (12 - 20)  SpO2: 96% (12-26-23 @ 08:46) (93% - 98%)  Wt(kg): --      I&O's Summary    25 Dec 2023 07:01  -  26 Dec 2023 07:00  --------------------------------------------------------  IN: 278 mL / OUT: 1000 mL / NET: -722 mL        Daily Height in cm: 172.72 (25 Dec 2023 10:00)    Daily       ECG:  	    LABS:	 	                                    14.3   6.19  )-----------( 152      ( 26 Dec 2023 04:28 )             43.5     12-26    142  |  110<H>  |  15.7  ----------------------------<  111<H>  4.8   |  23.0  |  0.87    Ca    8.5      26 Dec 2023 04:28  Phos  2.1     12-26  Mg     2.1     12-26    TPro  5.3<L>  /  Alb  3.5  /  TBili  0.4  /  DBili  x   /  AST  27  /  ALT  23  /  AlkPhos  68  12-26      Tnl:    Lipid Profile:   TC  TG  LDL  HDL    HgA1c:     proBNP:       A/P:        #Pre cath  -plan for LHC via RA vs FA  -patient seen and examined  -confirmed appropriate NPO duration  -ECG and Labs reviewed  -Aspirin 81mg po pre-cath  -NS 250mL IV bolus pre-cath****  -procedure discussed with patient; risks and benefits explained, questions answered  -consent obtained by attending IC                                                                                 Department of Cardiology                                                                  [facility]/[place] [address] [address] [address] [facility] [address]                                                            Telephone: [phone]. Fax:[phone]                                                                             Interventional Cardiology Consult/Pre/Post-Cath Progress Note      Interventional Cardiology consulting/following for CP elevated Trop  Update: No acute events overnight, NAD and HDS.   Plan/Indication: Significant risk factors (CABG 3 V ) now for [facility]   No concerns or contraidnications to proceeding as planned      Symptoms:        Angina (Class): CCS III       Ischemic Symptoms: CP     Assessment of LVEF:       EF: 60%       Assessed by: Study Information: Image quality for this study is adequate.    _______________________________________________________________________________________     CONCLUSIONS:      1. Left ventricular cavity is small. Left ventricular systolic function is normal with an ejection fraction visually estimated at 60 to 65 %.   2. Mid anteroseptal segment and basal inferior segment are abnormal.   3. Normal left ventricular diastolic function, with normal filling pressure.   4. Normal right ventricular cavity size and systolic function.   5. The left atrium is normal.   6. The right atrium is normal in size.  7. Fibrocalcific aortic valve sclerosis without stenosis.   8. Mild mitral regurgitation.   9. Mild tricuspid regurgitation.  10. Estimated pulmonary artery systolic pressure is 37 mmHg.  11. Aortic root at the sinuses of Valsalva is normal in size, measuring 3.46 cm (indexed 1.75 cm/m²). Ascending aorta diameter is normal in size, measuring 3.24 cm (indexed 1.64 cm/m²).  12. No pericardial effusion seen.         Date: 12/25/23        Risk Assessments:  ASA: 2  Mallampati: 3  Bleeding Risk: 1.5  Creatinine: 0.86  GFR:     Associated Risk Factors:        Frailty Assessment (none/mild/mod/severe):       Cerebrovascular Disease: N/A       Chronic Lung Disease: N/A       Peripheral Arterial Disease: N/A       Chronic Kidney Disease (if yes, what is GFR): N/A       Uncontrolled Diabetes (if yes, what is HgbA1C or FBS): N/A       Poorly Controlled Hypertension (if yes, what is SBP): N/A       Morbid Obesity (if yes, what is BMI): N/A       History of Recent Ventricular Arrhythmia: N/A       Inability to Ambulate Safely: N/A       Need for Therapeutic Anticoagulation: N/A       Antiplatelet or Contrast Allergy: N/A    Antianginal Therapies:        Beta Blockers:         Calcium Channel Blockers:        Long Acting Nitrates:        Ranexa:     	  MEDICATIONS:  metoprolol tartrate 25 milliGRAM(s) Oral every 12 hours        aspirin 325 milliGRAM(s) Oral daily      atorvastatin 80 milliGRAM(s) Oral at bedtime  levothyroxine 88 MICROGram(s) Oral daily  heparin   Injectable 4700 Unit(s) IV Push every 6 hours PRN  heparin  Infusion. 700 Unit(s)/Hr IV Continuous <Continuous>  potassium phosphate / sodium phosphate Powder (PHOS-NaK) 1 Packet(s) Oral once        ROS: as stated above, otherwise negative    PHYSICAL EXAM:  Constitutional: A & O x 3  HEENT:   Normal oral mucosa, PERRL, EOMI	  Cardiovascular: Normal S1 S2, No JVD, No murmurs  Respiratory: Lungs clear to auscultation	  Gastrointestinal:  Soft, Non-tender, + BS	  Skin: No rashes, No ecchymoses, No cyanosis  Neurologic: Non-focal  Extremities: Normal range of motion, no edema  Vascular: Peripheral pulses palpable + bilaterally      T(C): 36.7 (12-26-23 @ 08:46), Max: 36.9 (12-25-23 @ 19:35)  HR: 54 (12-26-23 @ 08:46) (54 - 86)  BP: 183/80 (12-26-23 @ 08:46) (106/61 - 183/80)  RR: 16 (12-26-23 @ 08:46) (12 - 20)  SpO2: 96% (12-26-23 @ 08:46) (93% - 98%)  Wt(kg): --      I&O's Summary    25 Dec 2023 07:01  -  26 Dec 2023 07:00  --------------------------------------------------------  IN: 278 mL / OUT: 1000 mL / NET: -722 mL        Daily Height in cm: 172.72 (25 Dec 2023 10:00)    Daily       ECG:  	    LABS:	 	                                    14.3   6.19  )-----------( 152      ( 26 Dec 2023 04:28 )             43.5     12-26    142  |  110<H>  |  15.7  ----------------------------<  111<H>  4.8   |  23.0  |  0.87    Ca    8.5      26 Dec 2023 04:28  Phos  2.1     12-26  Mg     2.1     12-26    TPro  5.3<L>  /  Alb  3.5  /  TBili  0.4  /  DBili  x   /  AST  27  /  ALT  23  /  AlkPhos  68  12-26      Tnl:    Lipid Profile:   TC  TG  LDL  HDL    HgA1c:     proBNP:       A/P:        #Pre cath  -plan for LHC via RA vs FA  -patient seen and examined  -confirmed appropriate NPO duration  -ECG and Labs reviewed  -Aspirin 81mg po pre-cath  -NS 250mL IV bolus pre-cath****  -procedure discussed with patient; risks and benefits explained, questions answered  -consent obtained by attending IC

## 2023-12-26 NOTE — DISCHARGE NOTE NURSING/CASE MANAGEMENT/SOCIAL WORK - PATIENT PORTAL LINK FT
You can access the FollowMyHealth Patient Portal offered by [facility] by registering at the following website: [email or web address] By joining [employer]’s FollowMyHealth portal, you will also be able to view your health information using other applications (apps) compatible with our system. You can access the FollowMyHealth Patient Portal offered by [facility] by registering at the following website: [email or web address]. By joining [email or web address]’s FollowMyHealth portal, you will also be able to view your health information using other applications (apps) compatible with our system.

## 2023-12-26 NOTE — DISCHARGE NOTE PROVIDER - CARE PROVIDER_API CALL
German Umaña  Cardiology  [address] [place] [address]  Phone: [phone]  Fax: [phone]  Follow Up Time:    German Umaña  Cardiology  [address] [place] [address]  Phone: [phone]  Fax: [phone]  Follow Up Time:    German Umaña  Cardiology  [address] [place] [address]  Phone: [phone]  Fax: [phone]  Follow Up Time: 2 weeks   German Umaña  Cardiology  [address] [place] [address]  Phone: [phone]  Fax: [phone]  Follow Up Time: 2 weeks

## 2023-12-26 NOTE — PROGRESS NOTE ADULT - SUBJECTIVE AND OBJECTIVE BOX
JACKELINE CONNOLLY  [id number]      Chief Complaint:  follow up NSTEMI    Interval History:  No events overnight     Tele:  Sinus       aspirin 325 milliGRAM(s) Oral daily  atorvastatin 80 milliGRAM(s) Oral at bedtime  heparin   Injectable 4700 Unit(s) IV Push every 6 hours PRN  heparin  Infusion. 700 Unit(s)/Hr IV Continuous <Continuous>  levothyroxine 88 MICROGram(s) Oral daily  metoprolol tartrate 25 milliGRAM(s) Oral every 12 hours  potassium phosphate / sodium phosphate Powder (PHOS-NaK) 1 Packet(s) Oral once          Physical Exam:  T(C): 36.7 (12-26-23 @ 04:30), Max: 36.9 (12-25-23 @ 19:35)  HR: 54 (12-26-23 @ 04:30) (54 - 112)  BP: 106/61 (12-26-23 @ 04:30) (106/61 - 141/74)  RR: 18 (12-26-23 @ 04:30) (12 - 24)  SpO2: 93% (12-26-23 @ 04:30) (93% - 98%)  Wt(kg): --  General: Comfortable in NAD  Neck: No JVD  CVS: nl s1s2, no s3  Pulm: CTA b/l  Abd: soft, non-tender  Ext: No c/c/e  Neuro A&O x3  Psych: Normal affect      Labs:   26 Dec 2023 04:28    142    |  110    |  15.7   ----------------------------<  111    4.8     |  23.0   |  0.87     Ca    8.5        26 Dec 2023 04:28  Phos  2.1       26 Dec 2023 04:28  Mg     2.1       26 Dec 2023 04:28    TPro  5.3    /  Alb  3.5    /  TBili  0.4    /  DBili  x      /  AST  27     /  ALT  23     /  AlkPhos  68     26 Dec 2023 04:28                          14.3   6.19  )-----------( 152      ( 26 Dec 2023 04:28 )             43.5     PT/INR - ( 25 Dec 2023 03:51 )   PT: 11.3 sec;   INR: 1.02 ratio         PTT - ( 26 Dec 2023 05:53 )  PTT:67.7 sec  CARDIAC MARKERS ( 25 Dec 2023 09:30 )  x     / x     / 347 U/L / x     / 7.8 ng/mL    TTE 12/25/23:   1. Left ventricular cavity is small. Left ventricular systolic function is normal with an ejection fraction visually estimated at 60 to 65 %.   2. Mid anteroseptal segment and basal inferior segment are abnormal.   3. Normal left ventricular diastolic function, with normal filling pressure.   4. Normal right ventricular cavity size and systolic function.   5. The left atrium is normal.   6. The right atrium is normal in size.   7. Fibrocalcific aortic valve sclerosis without stenosis.   8. Mild mitral regurgitation.   9. Mild tricuspid regurgitation.  10. Estimated pulmonary artery systolic pressure is 37 mmHg.     Assessment:    67 y/o M with hx CABG x 3 2006, hair cell leukemia 2006 (in remission), bladder CA s/p TURP x 2 in remission, HTN, HLD  with anginal chest pain associated  with SOB and sweating.   ECG showed PAF with ischemic ECG changes when pain increased  Troponins have trended +, now back to normal, elevated cardiac markers from NSTEMI Vs demand ischemia in the setting of rapid afib  back to sinus rhythm   Echo with small discrete WMA    Plan   LHC +/- PCI  continue with heparin drip, keep PTT within therapeutic range   continue current CV meds   further recs post intervention.  JACKELINE CONNOLLY  [id number]      Chief Complaint:  follow up NSTEMI    Interval History:  No events overnight     Tele:  Sinus       aspirin 325 milliGRAM(s) Oral daily  atorvastatin 80 milliGRAM(s) Oral at bedtime  heparin   Injectable 4700 Unit(s) IV Push every 6 hours PRN  heparin  Infusion. 700 Unit(s)/Hr IV Continuous <Continuous>  levothyroxine 88 MICROGram(s) Oral daily  metoprolol tartrate 25 milliGRAM(s) Oral every 12 hours  potassium phosphate / sodium phosphate Powder (PHOS-NaK) 1 Packet(s) Oral once          Physical Exam:  T(C): 36.7 (12-26-23 @ 04:30), Max: 36.9 (12-25-23 @ 19:35)  HR: 54 (12-26-23 @ 04:30) (54 - 112)  BP: 106/61 (12-26-23 @ 04:30) (106/61 - 141/74)  RR: 18 (12-26-23 @ 04:30) (12 - 24)  SpO2: 93% (12-26-23 @ 04:30) (93% - 98%)  Wt(kg): --  General: Comfortable in NAD  Neck: No JVD  CVS: nl s1s2, no s3  Pulm: CTA b/l  Abd: soft, non-tender  Ext: No c/c/e  Neuro A&O x3  Psych: Normal affect      Labs:   26 Dec 2023 04:28    142    |  110    |  15.7   ----------------------------<  111    4.8     |  23.0   |  0.87     Ca    8.5        26 Dec 2023 04:28  Phos  2.1       26 Dec 2023 04:28  Mg     2.1       26 Dec 2023 04:28    TPro  5.3    /  Alb  3.5    /  TBili  0.4    /  DBili  x      /  AST  27     /  ALT  23     /  AlkPhos  68     26 Dec 2023 04:28                          14.3   6.19  )-----------( 152      ( 26 Dec 2023 04:28 )             43.5     PT/INR - ( 25 Dec 2023 03:51 )   PT: 11.3 sec;   INR: 1.02 ratio         PTT - ( 26 Dec 2023 05:53 )  PTT:67.7 sec  CARDIAC MARKERS ( 25 Dec 2023 09:30 )  x     / x     / 347 U/L / x     / 7.8 ng/mL    TTE 12/25/23:   1. Left ventricular cavity is small. Left ventricular systolic function is normal with an ejection fraction visually estimated at 60 to 65 %.   2. Mid anteroseptal segment and basal inferior segment are abnormal.   3. Normal left ventricular diastolic function, with normal filling pressure.   4. Normal right ventricular cavity size and systolic function.   5. The left atrium is normal.   6. The right atrium is normal in size.   7. Fibrocalcific aortic valve sclerosis without stenosis.   8. Mild mitral regurgitation.   9. Mild tricuspid regurgitation.  10. Estimated pulmonary artery systolic pressure is 37 mmHg.     Assessment:    67 y/o M with hx CABG x 3 2006, hair cell leukemia 2006 (in remission), bladder CA s/p TURP x 2 in remission, HTN, HLD  with anginal chest pain associated  with SOB and sweating.   ECG showed PAF with ischemic ECG changes when pain increased  Troponins have trended +, now back to normal, elevated cardiac markers from NSTEMI Vs demand ischemia in the setting of rapid afib  back to sinus rhythm   Echo with small discrete WMA    Plan   LHC +/- PCI  continue with heparin drip, keep PTT within therapeutic range   continue current CV meds   further recs post intervention.

## 2023-12-26 NOTE — PROGRESS NOTE ADULT - SUBJECTIVE AND OBJECTIVE BOX
SUBJECTIVE:    Chief Complaint: Chest pain     INTERVAL HPI/OVERNIGHT EVENTS: Pt is 68 years old male with PMH of triple bypass in 2006, hair cell leukemia 2006 (in remission), bladder cancer s/p TURP x2 in remission, HTN, HLD presented with chest pain, sob since midnight. Patient seen and examined at bedside at AM.  Pt is stable overnight. No acute event. He denies chest pain, sob, n/v/d. Pt is on NPO, and ready for cath lab today.       MEDICATIONS  (STANDING):  aspirin 325 milliGRAM(s) Oral daily  atorvastatin 80 milliGRAM(s) Oral at bedtime  heparin  Infusion. 700 Unit(s)/Hr (7 mL/Hr) IV Continuous <Continuous>  levothyroxine 88 MICROGram(s) Oral daily  metoprolol tartrate 25 milliGRAM(s) Oral every 12 hours  potassium phosphate / sodium phosphate Powder (PHOS-NaK) 1 Packet(s) Oral once    MEDICATIONS  (PRN):  heparin   Injectable 4700 Unit(s) IV Push every 6 hours PRN For aPTT less than 40    Allergies    No Known Allergies      REVIEW OF SYSTEMS:    CONSTITUTIONAL: No fever, weight loss  RESPIRATORY: No cough, wheezing, hemoptysis; No shortness of breath  GASTROINTESTINAL: No abdominal or epigastric pain. No nausea, vomiting  NEUROLOGICAL: No headaches    OBJECTIVE:    Vital Signs Last 24 Hrs  T(C): 36.7 (26 Dec 2023 08:46), Max: 36.9 (25 Dec 2023 19:35)  T(F): 98 (26 Dec 2023 08:46), Max: 98.4 (25 Dec 2023 19:35)  HR: 54 (26 Dec 2023 08:46) (54 - 77)  BP: 183/80 (26 Dec 2023 08:46) (106/61 - 183/80)  BP(mean): 82 (25 Dec 2023 22:35) (73 - 94)  RR: 16 (26 Dec 2023 08:46) (12 - 20)  SpO2: 96% (26 Dec 2023 08:46) (93% - 98%)    Parameters below as of 26 Dec 2023 08:46  Patient On (Oxygen Delivery Method): room air        PHYSICAL EXAM:    GENERAL: NAD  CHEST/LUNG: Clear to auscultation bilaterally, A/P; respirations unlabored on RA   HEART: S1S2+, Regular rate and rhythm, no m/r/g.   ABDOMEN: Soft, Nontender, Nondistended; Bowel sounds present  SKIN: warm, dry  NEURO: AAOX3, grossly non-focal     Lab/ Imaging:    LABS:                        14.3   6.19  )-----------( 152      ( 26 Dec 2023 04:28 )             43.5     12-26    142  |  110<H>  |  15.7  ----------------------------<  111<H>  4.8   |  23.0  |  0.87    Ca    8.5      26 Dec 2023 04:28  Phos  2.1     12-26  Mg     2.1     12-26    TPro  5.3<L>  /  Alb  3.5  /  TBili  0.4  /  DBili  x   /  AST  27  /  ALT  23  /  AlkPhos  68  12-26    PT/INR - ( 25 Dec 2023 03:51 )   PT: 11.3 sec;   INR: 1.02 ratio         PTT - ( 26 Dec 2023 05:53 )  PTT:67.7 sec  Urinalysis Basic - ( 26 Dec 2023 04:28 )    Color: x / Appearance: x / SG: x / pH: x  Gluc: 111 mg/dL / Ketone: x  / Bili: x / Urobili: x   Blood: x / Protein: x / Nitrite: x   Leuk Esterase: x / RBC: x / WBC x   Sq Epi: x / Non Sq Epi: x / Bacteria: x      Echo (12/25/2023):   CONCLUSIONS:   1. Left ventricular cavity is small. Left ventricular systolic function is normal with an ejection fraction visually estimated at 60 to 65 %.   2. Mid anteroseptal segment and basal inferior segment are abnormal.   3. Normal left ventricular diastolic function, with normal filling pressure.   4. Normal right ventricular cavity size and systolic function.   5. The left atrium is normal.   6. The right atrium is normal in size.   7. Fibrocalcific aortic valve sclerosis without stenosis.   8. Mild mitral regurgitation.   9. Mild tricuspid regurgitation.  10. Estimated pulmonary artery systolic pressure is 37 mmHg.  11. Aortic root at the sinuses of Valsalva is normal in size, measuring 3.46 cm (indexed 1.75 cm/m²). Ascending aorta diameter is normal in size, measuring 3.24 cm (indexed 1.64 cm/m²).  12. No pericardial effusion seen.

## 2023-12-26 NOTE — PROGRESS NOTE ADULT - SUBJECTIVE AND OBJECTIVE BOX
Department of Cardiology                                                                  [facility]/[place] [address] [address] / [facility]                                                            Telephone: [phone]. Fax:[phone]                                                    Post- Procedure Note: Left Heart Cardiac Catheterization       HPI:  67 y/o male with pmhx of triple bypass in 2006, hair cell leukemia 2006 (in remission), bladder CA s/p TURP x 2 in remission, HTN, HLD presents with chest pain since midnight. Pt reports that he was sitting down watching TV, when he developed sudden onset mid-sternal/left sided chest pain associated  with SOB and sweating. Denies ever experiencing this before. Took 325 mg aspirin prior to arrival.  Pt follows with Dr. German Umaañ cardiologist from [facility]. States last echo and carotid US was 1 yr ago but unsure of results. Pt does admit to drinking alcohol tonight bc he was at a family party but denies daily ETOH use. Quit smoking 50 yrs ago and denies illicit drug use. First EKG here was without evidence of acute ischemic changes and troponin was 39. Pt then complained of increased pain and repeat EKG showed diffuse ST depressions with second troponin elevated at 63. Pt also noted to be in new afib RVR. Was given sublingual NGT, Brilinta, and Lopressor. Pt then became hypotensive. After discussion with cardiology (Dr. Mauricio), patient was placed on esmolol drip. Repeat EKG showed resolution of prior ischemic changes. Cardiology deemed EKG changes were likely from his underlying multivessel disease with new afib and is planning for nonurgent cardiac cath today. Patient admitted to MICU for further management.  (25 Dec 2023 05:48)    Post Cath Narrative:  68y  Male is now s/p left heart catheterization via Right AF angioseal.     LIMA Patent   SVG d1 closed   SHIVA 3.5 X 26 SVG OM1     HEPARIN   FENTANYL 50mcg   OMNIPAQUE 95   VERSED 1mg   nIcardipine 5 mg   IC Nitro 100MCG  FLUIDS pre and post 500 total   SHIVA TYPE SIZE LOCATION   SVG OM1 2.5 x 26       Home Medications:  Altace 10 mg oral capsule: 1 cap(s) orally once a day (26 Dec 2023 10:40)  aspirin 325 mg oral tablet: 1 tab(s) orally once a day (26 Dec 2023 10:40)  Crestor 10 mg oral tablet: 1 tab(s) orally once a day (at bedtime) (26 Dec 2023 10:40)  Fish Oil 1200 mg oral capsule: 1 cap(s) orally 3 times a day (26 Dec 2023 10:40)  Synthroid 75 mcg (0.075 mg) oral tablet: 1 tab(s) orally once a day (17 Jan 2015 10:55)  Toprol-XL 50 mg oral tablet, extended release: 0.5 tab(s) orally once a day (26 Dec 2023 08:48)    No Known Allergies      Objective:  Vital Signs Last 24 Hrs  T(C): 36.7 (26 Dec 2023 08:46), Max: 36.9 (25 Dec 2023 19:35)  T(F): 98 (26 Dec 2023 08:46), Max: 98.4 (25 Dec 2023 19:35)  HR: 58 (26 Dec 2023 11:10) (53 - 68)  BP: 149/70 (26 Dec 2023 11:10) (106/61 - 183/80)  BP(mean): 82 (25 Dec 2023 22:35) (73 - 94)  RR: 16 (26 Dec 2023 11:10) (15 - 20)  SpO2: 97% (26 Dec 2023 11:10) (93% - 97%)    Parameters below as of 26 Dec 2023 11:10  Patient On (Oxygen Delivery Method): room air                                14.3   6.19  )-----------( 152      ( 26 Dec 2023 04:28 )             43.5     12-26    142  |  110<H>  |  15.7  ----------------------------<  111<H>  4.8   |  23.0  |  0.87    Ca    8.5      26 Dec 2023 04:28  Phos  2.1     12-26  Mg     2.1     12-26    TPro  5.3<L>  /  Alb  3.5  /  TBili  0.4  /  DBili  x   /  AST  27  /  ALT  23  /  AlkPhos  68  12-26    PT/INR - ( 25 Dec 2023 03:51 )   PT: 11.3 sec;   INR: 1.02 ratio         PTT - ( 26 Dec 2023 05:53 )  PTT:67.7 sec      68y  Male is now s/p left heart catheterization via RFA angioseal benign.     -ADMIT due to: / Pt is already in-patient  -post cardiac cath orders  -radial or groin precautions  -bedrest x hours post procedure  -EKG post cath   -labs and EKG in am  -continue current medical therapy  -Dual anti platelet therapy with aspirin/ plavix (brilinta)  -statin therapy  -beta blocker  -arb/ace  -post cath fluid hydration for low GFR   -follow up outpt in 2 weeks with Cardiologist  -[facility] following during hospitalization  - .rehab  -UPON DISCHARGE HOLD METFORMIN FOR 2 DAYS AFTER CATH.   -Management per Hospitalist / ICU  -Discharge in am if overnight tele, EKG, labs in am all remain WNL                                                                            Department of Cardiology                                                                  [facility]/[place] [address] [address] [facility]                                                            Telephone: [phone]. Fax:[phone]                                                    Post- Procedure Note: Left Heart Cardiac Catheterization       HPI:  69 y/o male with pmhx of triple bypass in 2006, hair cell leukemia 2006 (in remission), bladder CA s/p TURP x 2 in remission, HTN, HLD presents with chest pain since midnight. Pt reports that he was sitting down watching TV, when he developed sudden onset mid-sternal/left sided chest pain associated  with SOB and sweating. Denies ever experiencing this before. Took 325 mg aspirin prior to arrival.  Pt follows with Dr. German Umaña cardiologist from [facility]. States last echo and carotid US was 1 yr ago but unsure of results. Pt does admit to drinking alcohol tonight bc he was at a family party but denies daily ETOH use. Quit smoking 50 yrs ago and denies illicit drug use. First EKG here was without evidence of acute ischemic changes and troponin was 39. Pt then complained of increased pain and repeat EKG showed diffuse ST depressions with second troponin elevated at 63. Pt also noted to be in new afib RVR. Was given sublingual NGT, Brilinta, and Lopressor. Pt then became hypotensive. After discussion with cardiology (Dr. Mauricio), patient was placed on esmolol drip. Repeat EKG showed resolution of prior ischemic changes. Cardiology deemed EKG changes were likely from his underlying multivessel disease with new afib and is planning for nonurgent cardiac cath today. Patient admitted to MICU for further management.  (25 Dec 2023 05:48)    Post Cath Narrative:  68y  Male is now s/p left heart catheterization via Right AF angioseal.     LIMA Patent   SVG d1 closed   SHIVA 3.5 X 26 SVG OM1     HEPARIN   FENTANYL 50mcg   OMNIPAQUE 95   VERSED 1mg   nIcardipine 5 mg   IC Nitro 100MCG  FLUIDS pre and post 500 total   SHIVA TYPE SIZE LOCATION   SVG OM1 2.5 x 26       Home Medications:  Altace 10 mg oral capsule: 1 cap(s) orally once a day (26 Dec 2023 10:40)  aspirin 325 mg oral tablet: 1 tab(s) orally once a day (26 Dec 2023 10:40)  Crestor 10 mg oral tablet: 1 tab(s) orally once a day (at bedtime) (26 Dec 2023 10:40)  Fish Oil 1200 mg oral capsule: 1 cap(s) orally 3 times a day (26 Dec 2023 10:40)  Synthroid 75 mcg (0.075 mg) oral tablet: 1 tab(s) orally once a day (17 Jan 2015 10:55)  Toprol-XL 50 mg oral tablet, extended release: 0.5 tab(s) orally once a day (26 Dec 2023 08:48)    No Known Allergies      Objective:  Vital Signs Last 24 Hrs  T(C): 36.7 (26 Dec 2023 08:46), Max: 36.9 (25 Dec 2023 19:35)  T(F): 98 (26 Dec 2023 08:46), Max: 98.4 (25 Dec 2023 19:35)  HR: 58 (26 Dec 2023 11:10) (53 - 68)  BP: 149/70 (26 Dec 2023 11:10) (106/61 - 183/80)  BP(mean): 82 (25 Dec 2023 22:35) (73 - 94)  RR: 16 (26 Dec 2023 11:10) (15 - 20)  SpO2: 97% (26 Dec 2023 11:10) (93% - 97%)    Parameters below as of 26 Dec 2023 11:10  Patient On (Oxygen Delivery Method): room air                                14.3   6.19  )-----------( 152      ( 26 Dec 2023 04:28 )             43.5     12-26    142  |  110<H>  |  15.7  ----------------------------<  111<H>  4.8   |  23.0  |  0.87    Ca    8.5      26 Dec 2023 04:28  Phos  2.1     12-26  Mg     2.1     12-26    TPro  5.3<L>  /  Alb  3.5  /  TBili  0.4  /  DBili  x   /  AST  27  /  ALT  23  /  AlkPhos  68  12-26    PT/INR - ( 25 Dec 2023 03:51 )   PT: 11.3 sec;   INR: 1.02 ratio         PTT - ( 26 Dec 2023 05:53 )  PTT:67.7 sec      68y  Male is now s/p left heart catheterization via RFA angioseal benign.     -ADMIT due to: / Pt is already in-patient  -post cardiac cath orders  -radial or groin precautions  -bedrest x hours post procedure  -EKG post cath   -labs and EKG in am  -continue current medical therapy  -Dual anti platelet therapy with aspirin/ plavix (brilinta)  -statin therapy  -beta blocker  -arb/ace  -post cath fluid hydration for low GFR   -follow up outpt in 2 weeks with Cardiologist  -[facility] following during hospitalization  - .rehab  -UPON DISCHARGE HOLD METFORMIN FOR 2 DAYS AFTER CATH.   -Management per Hospitalist / ICU  -Discharge in am if overnight tele, EKG, labs in am all remain WNL                                                                            Department of Cardiology                                                                  [facility]/[place] [address] [address] [facility]                                                            Telephone: [phone]. Fax:[phone]                                                    Post- Procedure Note: Left Heart Cardiac Catheterization       HPI:  67 y/o male with pmhx of triple bypass in 2006, hair cell leukemia 2006 (in remission), bladder CA s/p TURP x 2 in remission, HTN, HLD presents with chest pain since midnight. Pt reports that he was sitting down watching TV, when he developed sudden onset mid-sternal/left sided chest pain associated  with SOB and sweating. Denies ever experiencing this before. Took 325 mg aspirin prior to arrival.  Pt follows with Dr. German Umaña cardiologist from [facility]. States last echo and carotid US was 1 yr ago but unsure of results. Pt does admit to drinking alcohol tonight bc he was at a family party but denies daily ETOH use. Quit smoking 50 yrs ago and denies illicit drug use. First EKG here was without evidence of acute ischemic changes and troponin was 39. Pt then complained of increased pain and repeat EKG showed diffuse ST depressions with second troponin elevated at 63. Pt also noted to be in new afib RVR. Was given sublingual NGT, Brilinta, and Lopressor. Pt then became hypotensive. After discussion with cardiology (Dr. Mauricio), patient was placed on esmolol drip. Repeat EKG showed resolution of prior ischemic changes. Cardiology deemed EKG changes were likely from his underlying multivessel disease with new afib and is planning for nonurgent cardiac cath today. Patient admitted to MICU for further management.  (25 Dec 2023 05:48)    Post Cath Narrative:  68y  Male is now s/p left heart catheterization via Right AF angioseal.     LIMA Patent   SVG d1 closed   SHIVA 3.5 X 26 SVG OM1     HEPARIN 6000  FENTANYL 50mcg   OMNIPAQUE 95   VERSED 1mg   nicardipine 5 mg   IC Nitro 100MCG  FLUIDS pre and post 500 total   SHIVA TYPE SIZE LOCATION   SVG OM1 2.5 x 26       Home Medications:  Altace 10 mg oral capsule: 1 cap(s) orally once a day (26 Dec 2023 10:40)  aspirin 325 mg oral tablet: 1 tab(s) orally once a day (26 Dec 2023 10:40)  Crestor 10 mg oral tablet: 1 tab(s) orally once a day (at bedtime) (26 Dec 2023 10:40)  Fish Oil 1200 mg oral capsule: 1 cap(s) orally 3 times a day (26 Dec 2023 10:40)  Synthroid 75 mcg (0.075 mg) oral tablet: 1 tab(s) orally once a day (17 Jan 2015 10:55)  Toprol-XL 50 mg oral tablet, extended release: 0.5 tab(s) orally once a day (26 Dec 2023 08:48)    No Known Allergies      Objective:  Vital Signs Last 24 Hrs  T(C): 36.7 (26 Dec 2023 08:46), Max: 36.9 (25 Dec 2023 19:35)  T(F): 98 (26 Dec 2023 08:46), Max: 98.4 (25 Dec 2023 19:35)  HR: 58 (26 Dec 2023 11:10) (53 - 68)  BP: 149/70 (26 Dec 2023 11:10) (106/61 - 183/80)  BP(mean): 82 (25 Dec 2023 22:35) (73 - 94)  RR: 16 (26 Dec 2023 11:10) (15 - 20)  SpO2: 97% (26 Dec 2023 11:10) (93% - 97%)    Parameters below as of 26 Dec 2023 11:10  Patient On (Oxygen Delivery Method): room air                                14.3   6.19  )-----------( 152      ( 26 Dec 2023 04:28 )             43.5     12-26    142  |  110<H>  |  15.7  ----------------------------<  111<H>  4.8   |  23.0  |  0.87    Ca    8.5      26 Dec 2023 04:28  Phos  2.1     12-26  Mg     2.1     12-26    TPro  5.3<L>  /  Alb  3.5  /  TBili  0.4  /  DBili  x   /  AST  27  /  ALT  23  /  AlkPhos  68  12-26    PT/INR - ( 25 Dec 2023 03:51 )   PT: 11.3 sec;   INR: 1.02 ratio         PTT - ( 26 Dec 2023 05:53 )  PTT:67.7 sec      68y  Male is now s/p left heart catheterization via RFA angioseal benign SHIVA SVG to OM1      -decrease aspirin to 81 mg po QD  -Eliquis po 2 x day for at least 30 day   -plavix 75 mg po qd   -post cardiac cath orders  -groin precautions  -bedrest x 2 hours post procedure  -EKG post cath done   -labs and EKG in am  -continue current medical therapy ( crestor, 20mg po qd altace metoprolol synthroid   -Dual anti platelet therapy with aspirin/ plavix   -post cath fluid hydration 250 cc   -follow up outpt in 2 weeks with Cardiologist as out patient to discuss management of afib   cardiac rehab info provided/referral and communication to cardiac rehab completed  -Management per Hospitalist                                                                             Department of Cardiology                                                                  [facility]/[place] [address] [address] [facility]                                                            Telephone: [phone]. Fax:[phone]                                                    Post- Procedure Note: Left Heart Cardiac Catheterization       HPI:  69 y/o male with pmhx of triple bypass in 2006, hair cell leukemia 2006 (in remission), bladder CA s/p TURP x 2 in remission, HTN, HLD presents with chest pain since midnight. Pt reports that he was sitting down watching TV, when he developed sudden onset mid-sternal/left sided chest pain associated  with SOB and sweating. Denies ever experiencing this before. Took 325 mg aspirin prior to arrival.  Pt follows with Dr. German Umaña cardiologist from [facility]. States last echo and carotid US was 1 yr ago but unsure of results. Pt does admit to drinking alcohol tonight bc he was at a family party but denies daily ETOH use. Quit smoking 50 yrs ago and denies illicit drug use. First EKG here was without evidence of acute ischemic changes and troponin was 39. Pt then complained of increased pain and repeat EKG showed diffuse ST depressions with second troponin elevated at 63. Pt also noted to be in new afib RVR. Was given sublingual NGT, Brilinta, and Lopressor. Pt then became hypotensive. After discussion with cardiology (Dr. Mauricio), patient was placed on esmolol drip. Repeat EKG showed resolution of prior ischemic changes. Cardiology deemed EKG changes were likely from his underlying multivessel disease with new afib and is planning for nonurgent cardiac cath today. Patient admitted to MICU for further management.  (25 Dec 2023 05:48)    Post Cath Narrative:  68y  Male is now s/p left heart catheterization via Right AF angioseal.     LIMA Patent   SVG d1 closed   SHIVA 3.5 X 26 SVG OM1     HEPARIN 6000  FENTANYL 50mcg   OMNIPAQUE 95   VERSED 1mg   nicardipine 5 mg   IC Nitro 100MCG  FLUIDS pre and post 500 total   SHIVA TYPE SIZE LOCATION   SVG OM1 2.5 x 26       Home Medications:  Altace 10 mg oral capsule: 1 cap(s) orally once a day (26 Dec 2023 10:40)  aspirin 325 mg oral tablet: 1 tab(s) orally once a day (26 Dec 2023 10:40)  Crestor 10 mg oral tablet: 1 tab(s) orally once a day (at bedtime) (26 Dec 2023 10:40)  Fish Oil 1200 mg oral capsule: 1 cap(s) orally 3 times a day (26 Dec 2023 10:40)  Synthroid 75 mcg (0.075 mg) oral tablet: 1 tab(s) orally once a day (17 Jan 2015 10:55)  Toprol-XL 50 mg oral tablet, extended release: 0.5 tab(s) orally once a day (26 Dec 2023 08:48)    No Known Allergies      Objective:  Vital Signs Last 24 Hrs  T(C): 36.7 (26 Dec 2023 08:46), Max: 36.9 (25 Dec 2023 19:35)  T(F): 98 (26 Dec 2023 08:46), Max: 98.4 (25 Dec 2023 19:35)  HR: 58 (26 Dec 2023 11:10) (53 - 68)  BP: 149/70 (26 Dec 2023 11:10) (106/61 - 183/80)  BP(mean): 82 (25 Dec 2023 22:35) (73 - 94)  RR: 16 (26 Dec 2023 11:10) (15 - 20)  SpO2: 97% (26 Dec 2023 11:10) (93% - 97%)    Parameters below as of 26 Dec 2023 11:10  Patient On (Oxygen Delivery Method): room air                                14.3   6.19  )-----------( 152      ( 26 Dec 2023 04:28 )             43.5     12-26    142  |  110<H>  |  15.7  ----------------------------<  111<H>  4.8   |  23.0  |  0.87    Ca    8.5      26 Dec 2023 04:28  Phos  2.1     12-26  Mg     2.1     12-26    TPro  5.3<L>  /  Alb  3.5  /  TBili  0.4  /  DBili  x   /  AST  27  /  ALT  23  /  AlkPhos  68  12-26    PT/INR - ( 25 Dec 2023 03:51 )   PT: 11.3 sec;   INR: 1.02 ratio         PTT - ( 26 Dec 2023 05:53 )  PTT:67.7 sec      68y  Male is now s/p left heart catheterization via RFA angioseal benign SHIVA SVG to OM1      -decrease aspirin to 81 mg po QD  -Eliquis po 2 x day for at least 30 day   -plavix 75 mg po qd   -post cardiac cath orders  -groin precautions  -bedrest x 2 hours post procedure  -EKG post cath done   -labs and EKG in am  -continue current medical therapy ( crestor, 20mg po qd altace metoprolol synthroid   -Dual anti platelet therapy with aspirin/ plavix   -post cath fluid hydration 250 cc   -follow up outpt in 2 weeks with Cardiologist as out patient to discuss management of afib   cardiac rehab info provided/referral and communication to cardiac rehab completed  -Management per Hospitalist                                                                             Department of Cardiology                                                                  [facility]/[place] [address] [address] [facility]                                                            Telephone: [phone]. Fax:[phone]                                                    Post- Procedure Note: Left Heart Cardiac Catheterization       HPI:  67 y/o male with pmhx of triple bypass in 2006, hair cell leukemia 2006 (in remission), bladder CA s/p TURP x 2 in remission, HTN, HLD presents with chest pain since midnight. Pt reports that he was sitting down watching TV, when he developed sudden onset mid-sternal/left sided chest pain associated  with SOB and sweating. Denies ever experiencing this before. Took 325 mg aspirin prior to arrival.  Pt follows with Dr. German Umaña cardiologist from [facility]. States last echo and carotid US was 1 yr ago but unsure of results. Pt does admit to drinking alcohol tonight bc he was at a family party but denies daily ETOH use. Quit smoking 50 yrs ago and denies illicit drug use. First EKG here was without evidence of acute ischemic changes and troponin was 39. Pt then complained of increased pain and repeat EKG showed diffuse ST depressions with second troponin elevated at 63. Pt also noted to be in new afib RVR. Was given sublingual NGT, Brilinta, and Lopressor. Pt then became hypotensive. After discussion with cardiology (Dr. Mauricio), patient was placed on esmolol drip. Repeat EKG showed resolution of prior ischemic changes. Cardiology deemed EKG changes were likely from his underlying multivessel disease with new afib and is planning for nonurgent cardiac cath today. Patient admitted to MICU for further management.  (25 Dec 2023 05:48)    Post Cath Narrative:  68y  Male is now s/p left heart catheterization via Right AF angioseal.     LIMA Patent   SVG d1 closed   SHIVA 3.5 X 26 SVG OM1     HEPARIN 6000  FENTANYL 50mcg   OMNIPAQUE 95   VERSED 1mg   nicardipine 5 mg   IC Nitro 100MCG  FLUIDS pre and post 500 total   SHIVA TYPE SIZE LOCATION   SVG OM1 2.5 x 26       Home Medications:  Altace 10 mg oral capsule: 1 cap(s) orally once a day (26 Dec 2023 10:40)  aspirin 325 mg oral tablet: 1 tab(s) orally once a day (26 Dec 2023 10:40)  Crestor 10 mg oral tablet: 1 tab(s) orally once a day (at bedtime) (26 Dec 2023 10:40)  Fish Oil 1200 mg oral capsule: 1 cap(s) orally 3 times a day (26 Dec 2023 10:40)  Synthroid 75 mcg (0.075 mg) oral tablet: 1 tab(s) orally once a day (17 Jan 2015 10:55)  Toprol-XL 50 mg oral tablet, extended release: 0.5 tab(s) orally once a day (26 Dec 2023 08:48)    No Known Allergies      Objective:  Vital Signs Last 24 Hrs  T(C): 36.7 (26 Dec 2023 08:46), Max: 36.9 (25 Dec 2023 19:35)  T(F): 98 (26 Dec 2023 08:46), Max: 98.4 (25 Dec 2023 19:35)  HR: 58 (26 Dec 2023 11:10) (53 - 68)  BP: 149/70 (26 Dec 2023 11:10) (106/61 - 183/80)  BP(mean): 82 (25 Dec 2023 22:35) (73 - 94)  RR: 16 (26 Dec 2023 11:10) (15 - 20)  SpO2: 97% (26 Dec 2023 11:10) (93% - 97%)    Parameters below as of 26 Dec 2023 11:10  Patient On (Oxygen Delivery Method): room air                                14.3   6.19  )-----------( 152      ( 26 Dec 2023 04:28 )             43.5     12-26    142  |  110<H>  |  15.7  ----------------------------<  111<H>  4.8   |  23.0  |  0.87    Ca    8.5      26 Dec 2023 04:28  Phos  2.1     12-26  Mg     2.1     12-26    TPro  5.3<L>  /  Alb  3.5  /  TBili  0.4  /  DBili  x   /  AST  27  /  ALT  23  /  AlkPhos  68  12-26    PT/INR - ( 25 Dec 2023 03:51 )   PT: 11.3 sec;   INR: 1.02 ratio         PTT - ( 26 Dec 2023 05:53 )  PTT:67.7 sec      68y  Male is now s/p left heart catheterization via RFA angioseal benign SHIVA SVG to OM1      -decrease aspirin to 81 mg po QD  -Eliquis po 2 x day for at least 30 day   -plavix 75 mg po qd   -post cardiac cath orders  -groin precautions  -bedrest x 2 hours post procedure  -EKG post cath done   -labs and EKG in am  -continue current medical therapy ( crestor, 20mg po qd altace metoprolol synthroid   -Dual anti platelet therapy with aspirin/ plavix   -post cath fluid hydration 250 cc   -follow up outpt in 2 weeks with Cardiologist as out patient to discuss management of afib   cardiac rehab info provided/referral and communication to cardiac rehab completed  -Management per Hospitalist   - OK to dc home as per Cardio if ok with Hospitalist                                                                             Department of Cardiology                                                                  [facility]/[place] [address] [address] [facility]                                                            Telephone: [phone]. Fax:[phone]                                                    Post- Procedure Note: Left Heart Cardiac Catheterization       HPI:  69 y/o male with pmhx of triple bypass in 2006, hair cell leukemia 2006 (in remission), bladder CA s/p TURP x 2 in remission, HTN, HLD presents with chest pain since midnight. Pt reports that he was sitting down watching TV, when he developed sudden onset mid-sternal/left sided chest pain associated  with SOB and sweating. Denies ever experiencing this before. Took 325 mg aspirin prior to arrival.  Pt follows with Dr. German Umaña cardiologist from [facility]. States last echo and carotid US was 1 yr ago but unsure of results. Pt does admit to drinking alcohol tonight bc he was at a family party but denies daily ETOH use. Quit smoking 50 yrs ago and denies illicit drug use. First EKG here was without evidence of acute ischemic changes and troponin was 39. Pt then complained of increased pain and repeat EKG showed diffuse ST depressions with second troponin elevated at 63. Pt also noted to be in new afib RVR. Was given sublingual NGT, Brilinta, and Lopressor. Pt then became hypotensive. After discussion with cardiology (Dr. Mauricio), patient was placed on esmolol drip. Repeat EKG showed resolution of prior ischemic changes. Cardiology deemed EKG changes were likely from his underlying multivessel disease with new afib and is planning for nonurgent cardiac cath today. Patient admitted to MICU for further management.  (25 Dec 2023 05:48)    Post Cath Narrative:  68y  Male is now s/p left heart catheterization via Right AF angioseal.     LIMA Patent   SVG d1 closed   SHIVA 3.5 X 26 SVG OM1     HEPARIN 6000  FENTANYL 50mcg   OMNIPAQUE 95   VERSED 1mg   nicardipine 5 mg   IC Nitro 100MCG  FLUIDS pre and post 500 total   SHIVA TYPE SIZE LOCATION   SVG OM1 2.5 x 26       Home Medications:  Altace 10 mg oral capsule: 1 cap(s) orally once a day (26 Dec 2023 10:40)  aspirin 325 mg oral tablet: 1 tab(s) orally once a day (26 Dec 2023 10:40)  Crestor 10 mg oral tablet: 1 tab(s) orally once a day (at bedtime) (26 Dec 2023 10:40)  Fish Oil 1200 mg oral capsule: 1 cap(s) orally 3 times a day (26 Dec 2023 10:40)  Synthroid 75 mcg (0.075 mg) oral tablet: 1 tab(s) orally once a day (17 Jan 2015 10:55)  Toprol-XL 50 mg oral tablet, extended release: 0.5 tab(s) orally once a day (26 Dec 2023 08:48)    No Known Allergies      Objective:  Vital Signs Last 24 Hrs  T(C): 36.7 (26 Dec 2023 08:46), Max: 36.9 (25 Dec 2023 19:35)  T(F): 98 (26 Dec 2023 08:46), Max: 98.4 (25 Dec 2023 19:35)  HR: 58 (26 Dec 2023 11:10) (53 - 68)  BP: 149/70 (26 Dec 2023 11:10) (106/61 - 183/80)  BP(mean): 82 (25 Dec 2023 22:35) (73 - 94)  RR: 16 (26 Dec 2023 11:10) (15 - 20)  SpO2: 97% (26 Dec 2023 11:10) (93% - 97%)    Parameters below as of 26 Dec 2023 11:10  Patient On (Oxygen Delivery Method): room air                                14.3   6.19  )-----------( 152      ( 26 Dec 2023 04:28 )             43.5     12-26    142  |  110<H>  |  15.7  ----------------------------<  111<H>  4.8   |  23.0  |  0.87    Ca    8.5      26 Dec 2023 04:28  Phos  2.1     12-26  Mg     2.1     12-26    TPro  5.3<L>  /  Alb  3.5  /  TBili  0.4  /  DBili  x   /  AST  27  /  ALT  23  /  AlkPhos  68  12-26    PT/INR - ( 25 Dec 2023 03:51 )   PT: 11.3 sec;   INR: 1.02 ratio         PTT - ( 26 Dec 2023 05:53 )  PTT:67.7 sec      68y  Male is now s/p left heart catheterization via RFA angioseal benign SHIVA SVG to OM1      -decrease aspirin to 81 mg po QD  -Eliquis po 2 x day for at least 30 day   -plavix 75 mg po qd   -post cardiac cath orders  -groin precautions  -bedrest x 2 hours post procedure  -EKG post cath done   -labs and EKG in am  -continue current medical therapy ( crestor, 20mg po qd altace metoprolol synthroid   -Dual anti platelet therapy with aspirin/ plavix   -post cath fluid hydration 250 cc   -follow up outpt in 2 weeks with Cardiologist as out patient to discuss management of afib   cardiac rehab info provided/referral and communication to cardiac rehab completed  -Management per Hospitalist   - OK to dc home as per Cardio if ok with Hospitalist

## 2023-12-26 NOTE — PROGRESS NOTE ADULT - ASSESSMENT
Pt is 68y/oM PMH CABG x3 (2006), Hairy cell leukemia (2006) in remission, bladder CA s/p TURP x2 in remission, HTN, HLD presenting with sudden onset mid-sternal/L-sided chest pain after alcohol intake, associated with SOB and sweating. Took 325mg asa prior to arrival. 1st EKG without changes, trop 39. Later pt with increased pain and repeat EKG with diffuse ST depressions and 2nd trop 63, also noted to be in afib RVR. Given sublingual NGT, brilinta, lopressor. Pt then had episode hypotension, resolved with IVF. Started on esmolol gtt. Repeat EKG with resolution of prior changes, troponin came back to normal (25) . As per cardio, ekg changes likely due to underlying multivessel disease with new afib. pt also started on heparin gtt. Chest pain resolved. pt converted to NSR. Pt seen by cardiology. now downgraded to tele in pm 12/25. Pt is ready for cath lab today for further assessment     NSTEMI, new onset of A-fib, RVR  - Pt has hx of HTN, HLD, triple-bypass   - substernal chest pain   - EKG: ST-depression with troponin elevation. New onset of A-fib with RVR  - TTE: LVEF 60 -65%, mid anteroseptal segment and basal inferior segment abnormal   - Continue ?   - Cath lab today     HTN, HLD  - Continue metoprolol, atorvastatin     Hairy cell leukemia in remission    Bladder CA s/p TURP x2 in remission         Pt is 68y/oM PMH CABG x3 (2006), Hairy cell leukemia (2006) in remission, bladder CA s/p TURP x2 in remission, HTN, HLD presenting with sudden onset mid-sternal/L-sided chest pain after alcohol intake, associated with SOB and sweating. Took 325mg asa prior to arrival. 1st EKG without changes, trop 39. Later pt with increased pain and repeat EKG with diffuse ST depressions and 2nd trop 63, also noted to be in afib RVR. Given sublingual NGT, brilinta, lopressor. Pt then had episode hypotension, resolved with IVF. Started on esmolol gtt. Repeat EKG with resolution of prior changes, troponin came back to normal (25) . As per cardio, ekg changes likely due to underlying multivessel disease with new afib. pt also started on heparin gtt. Chest pain resolved. pt converted to NSR. Pt seen by cardiology. now downgraded to tele in pm 12/25. Pt is ready for cath lab today for further assessment     NSTEMI, new onset of A-fib, RVR  - Pt has hx of HTN, HLD, triple-bypass   - substernal chest pain   - EKG: ST-depression with troponin elevation. New onset of A-fib with RVR  - TTE: LVEF 60 -65%, mid anteroseptal segment and basal inferior segment abnormal   - Continue ?   - Cath lab today     Pulmonary HTN  -Echo: pulmonary artery systolic pressure is 37 mmHg.     HTN, HLD  - Continue metoprolol, atorvastatin     Hairy cell leukemia in remission    Bladder CA s/p TURP x2 in remission

## 2023-12-27 ENCOUNTER — TRANSCRIPTION ENCOUNTER (OUTPATIENT)
Age: 68
End: 2023-12-27

## 2023-12-28 ENCOUNTER — TRANSCRIPTION ENCOUNTER (OUTPATIENT)
Age: 68
End: 2023-12-28

## 2024-01-02 ENCOUNTER — APPOINTMENT (OUTPATIENT)
Age: 69
End: 2024-01-02
Payer: MEDICARE

## 2024-01-02 DIAGNOSIS — I48.91 UNSPECIFIED ATRIAL FIBRILLATION: ICD-10-CM

## 2024-01-02 DIAGNOSIS — I25.10 ATHEROSCLEROTIC HEART DISEASE OF NATIVE CORONARY ARTERY W/OUT ANGINA PECTORIS: ICD-10-CM

## 2024-01-02 DIAGNOSIS — I21.4 NON-ST ELEVATION (NSTEMI) MYOCARDIAL INFARCTION: ICD-10-CM

## 2024-01-02 PROCEDURE — 99495 TRANSJ CARE MGMT MOD F2F 14D: CPT

## 2024-01-03 PROBLEM — I21.4 NSTEMI, INITIAL EPISODE OF CARE: Status: ACTIVE | Noted: 2024-01-03

## 2024-01-03 PROBLEM — I25.10 CAD (CORONARY ARTERY DISEASE): Status: ACTIVE | Noted: 2024-01-03

## 2024-01-03 PROBLEM — I48.91 AFIB: Status: ACTIVE | Noted: 2024-01-03

## 2024-01-03 RX ORDER — RAMIPRIL 10 MG/1
10 CAPSULE ORAL
Refills: 0 | Status: ACTIVE | COMMUNITY
Start: 2024-01-03

## 2024-01-03 RX ORDER — ROSUVASTATIN CALCIUM 20 MG/1
20 TABLET, FILM COATED ORAL
Refills: 0 | Status: ACTIVE | COMMUNITY
Start: 2024-01-03

## 2024-01-03 RX ORDER — OMEPRAZOLE 20 MG/1
20 TABLET, DELAYED RELEASE ORAL
Refills: 0 | Status: ACTIVE | COMMUNITY
Start: 2024-01-03

## 2024-01-03 RX ORDER — APIXABAN 5 MG/1
5 TABLET, FILM COATED ORAL
Refills: 0 | Status: ACTIVE | COMMUNITY
Start: 2024-01-03

## 2024-01-03 RX ORDER — LEVOTHYROXINE SODIUM 0.09 MG/1
88 TABLET ORAL
Refills: 0 | Status: ACTIVE | COMMUNITY
Start: 2024-01-03

## 2024-01-03 RX ORDER — CLOPIDOGREL 75 MG/1
75 TABLET, FILM COATED ORAL
Refills: 0 | Status: ACTIVE | COMMUNITY
Start: 2024-01-03

## 2024-01-03 RX ORDER — ASPIRIN ENTERIC COATED TABLETS 81 MG 81 MG/1
81 TABLET, DELAYED RELEASE ORAL
Refills: 0 | Status: ACTIVE | COMMUNITY
Start: 2024-01-03

## 2024-01-03 RX ORDER — METOPROLOL SUCCINATE 25 MG/1
25 TABLET, EXTENDED RELEASE ORAL
Refills: 0 | Status: ACTIVE | COMMUNITY
Start: 2024-01-03

## 2024-01-03 NOTE — COUNSELING
[de-identified] : Patient advised to continue with anticoagulation/antiplatelet regimen as directed. Medication education discussed in full detail with + teach back. Encouraged verbalization of fears and concerns. Report all chest pain/discomfort not relieved by rest or medication. Educated on monitoring blood pressure. Encouraged Smoking and alcohol cessation. Maintain Balanced diet Exercise as appropriate. Patient educated on s/s of MI with + teach back. Contact information given, patient advised to call with any questions/concerns.

## 2024-01-03 NOTE — REVIEW OF SYSTEMS
[Negative] : Psychiatric [FreeTextEntry3] : pooped blood vessel left eye [de-identified] : right groin ecchymosis noted denies bleeding/erythema/drainage

## 2024-01-03 NOTE — PLAN
[FreeTextEntry1] : CAD/NSTEMI: c/w medical management. monitor r groin for s/s infection f/u cardiology.  Afib: c/w medical management. f/u eps.  monitor left eye. if symptoms worsen f/u ophthalmology.

## 2024-01-03 NOTE — ASSESSMENT
[FreeTextEntry1] : Patient is a 68 year old male enrolled in the [facility] TCM program s/p a recent discharge from [facility] 12/25-12/26 for NSTEMI/AFib. PMH triple bypass 2006, hair cell laukemia 2006 (in remission), bladder CA s/p TURP x2 in remission. HTN, HLD. Patient had stent placed and was treated with medical management and sent home without HCS.  Upon arrival patient alert in nad, denies cp, sob, edema, fever, chills, n/v/d, cough. Reports feeling much better.  Reports pooped blood vessel in left eye after yelling at football game sunday; improving. Denies visual changes or headaches. F/U appointments with cardio 1/10, eps 1/15, pcp 1/16. Patient was contacted by Pao Francis RN from [facility] and medication reconciliation was done with in 48 hours of discharge 12/26.

## 2024-01-03 NOTE — PHYSICAL EXAM
[No Acute Distress] : no acute distress [Well Developed] : well developed [Well-Appearing] : well-appearing [Normal Outer Ear/Nose] : the outer ears and nose were normal in appearance [No Respiratory Distress] : no respiratory distress  [No Edema] : there was no peripheral edema [Normal] : no joint swelling and grossly normal strength and tone [Normal Affect] : the affect was normal [Alert and Oriented x3] : oriented to person, place, and time [Normal Mood] : the mood was normal [de-identified] : left eye slight redness noted

## 2024-01-03 NOTE — HISTORY OF PRESENT ILLNESS
[Home] : at home, [unfilled] , at the time of the visit. [Other Location: e.g. Home (Enter Location, City,State)___] : at [unfilled] [Verbal consent obtained from patient] : the patient, [unfilled] [Spouse] : spouse [FreeTextEntry1] : F/U hospital discharge for NSTEMI/AFib. [de-identified] : Patient is a 68 year old male enrolled in the [facility] TCM program s/p a recent discharge from [facility] 12/25-12/26 for NSTEMI/AFib. PMH triple bypass 2006, hair cell laukemia 2006 (in remission), bladder CA s/p TURP x2 in remission. HTN, HLD. Patient had stent placed and was treated with medical management and sent home without HCS.  Upon arrival patient alert in nad, denies cp, sob, edema, fever, chills, n/v/d, cough. Reports feeling much better.  Reports pooped blood vessel in left eye after yelling at football game sunday; improving. Denies visual changes or headaches. F/U appointments with cardio 1/10, eps 1/15, pcp 1/16. Patient was contacted by Pao Francis RN from [facility] and medication reconciliation was done with in 48 hours of discharge 12/26.  Copied from [facility]: Hospital Course: 68M with PMHx of triple bypass 2006, hair cell laukemia 2006 (in remission), bladder CA s/p TURP x2 in remission. HTN, HLD, presented w/ CP with SOB. Pt follow outpatient cardiologist Dr. German Umaña. Pt reports alcohol. Found in new onset of Afib w/ rvr and hypotensive. Admitted to MICU for new onset of Afib w/ rvr requiring Esmolol drip, downgraded to medicine. Patient now s/p LHC via RFA angioseal and found severe native 2 vessel CAD and benign SHIVA SVG to OM1 without any complications. Pt will be discharged home with Eliquis 5mg po bid and dual antiplatelet ASA 81mg and Plavix 75mg po qd and PPI for 30 days and then Plavix and Eliquis alone to complete a full year, following with ASA and Eliquis thereafter. Pt to continue BB and started on ACEi. Pt will followup outpatient cardiology [facility] with Lore Umaña and Darryl 2 weeks. Pt advised for groin precaution. Patient is medically optimized and hemodynamically stable to be discharged with appropriate follow ups.

## 2024-01-04 ENCOUNTER — TRANSCRIPTION ENCOUNTER (OUTPATIENT)
Age: 69
End: 2024-01-04

## 2024-01-11 ENCOUNTER — TRANSCRIPTION ENCOUNTER (OUTPATIENT)
Age: 69
End: 2024-01-11

## 2024-01-23 ENCOUNTER — TRANSCRIPTION ENCOUNTER (OUTPATIENT)
Age: 69
End: 2024-01-23

## 2024-01-26 ENCOUNTER — TRANSCRIPTION ENCOUNTER (OUTPATIENT)
Age: 69
End: 2024-01-26

## 2024-12-10 ENCOUNTER — OFFICE (OUTPATIENT)
Dept: URBAN - METROPOLITAN AREA CLINIC 115 | Facility: CLINIC | Age: 69
Setting detail: OPHTHALMOLOGY
End: 2024-12-10
Payer: MEDICARE

## 2024-12-10 DIAGNOSIS — H11.153: ICD-10-CM

## 2024-12-10 DIAGNOSIS — H25.13: ICD-10-CM

## 2024-12-10 DIAGNOSIS — H35.3111: ICD-10-CM

## 2024-12-10 DIAGNOSIS — H43.813: ICD-10-CM

## 2024-12-10 PROCEDURE — 99214 OFFICE O/P EST MOD 30 MIN: CPT | Performed by: OPHTHALMOLOGY

## 2024-12-10 PROCEDURE — 92250 FUNDUS PHOTOGRAPHY W/I&R: CPT | Performed by: OPHTHALMOLOGY

## 2024-12-10 ASSESSMENT — REFRACTION_MANIFEST
OD_CYLINDER: -2.00
OU_VA: 20/25-
OD_SPHERE: -3.00
OS_ADD: +2.50
OS_ADD: +2.50
OU_VA: 20/20
OD_ADD: +2.50
OD_SPHERE: -3.50
OS_SPHERE: -2.75
OD_VA1: 20/20
OS_CYLINDER: -2.25
OD_ADD: +2.50
OS_VA1: 20/20
OS_CYLINDER: -2.50
OS_AXIS: 096
OD_CYLINDER: -2.00
OD_SPHERE: -3.50
OS_ADD: +2.50
OD_AXIS: 087
OU_VA: 20/20
OS_VA1: 20/20-
OS_AXIS: 098
OD_AXIS: 095
OD_VA1: 20/20
OD_CYLINDER: -2.25
OS_VA1: 20/20
OS_AXIS: 084
OS_SPHERE: -3.50
OD_ADD: +2.50
OS_SPHERE: -3.00
OD_AXIS: 094
OS_CYLINDER: -2.25
OD_VA1: 20/20

## 2024-12-10 ASSESSMENT — REFRACTION_AUTOREFRACTION
OS_AXIS: 097
OD_AXIS: 089
OD_SPHERE: -3.00
OS_CYLINDER: -2.00
OD_CYLINDER: -2.25
OS_SPHERE: -3.75

## 2024-12-10 ASSESSMENT — CONFRONTATIONAL VISUAL FIELD TEST (CVF)
OD_FINDINGS: FULL
OS_FINDINGS: FULL

## 2024-12-10 ASSESSMENT — TONOMETRY
OS_IOP_MMHG: 18
OD_IOP_MMHG: 17

## 2024-12-10 ASSESSMENT — REFRACTION_CURRENTRX
OD_ADD: +2.50
OD_AXIS: 094
OS_CYLINDER: -2.50
OD_CYLINDER: -1.75
OD_VPRISM_DIRECTION: PROGS
OS_AXIS: 084
OD_SPHERE: -3.50
OS_ADD: +2.50
OS_SPHERE: -3.25
OS_VPRISM_DIRECTION: PROGS
OS_OVR_VA: 20/
OD_OVR_VA: 20/

## 2024-12-10 ASSESSMENT — VISUAL ACUITY
OD_BCVA: 20/20-1
OS_BCVA: 20/25

## 2025-03-11 ENCOUNTER — OFFICE (OUTPATIENT)
Dept: URBAN - METROPOLITAN AREA CLINIC 115 | Facility: CLINIC | Age: 70
Setting detail: OPHTHALMOLOGY
End: 2025-03-11
Payer: MEDICARE

## 2025-03-11 DIAGNOSIS — H43.813: ICD-10-CM

## 2025-03-11 DIAGNOSIS — H11.153: ICD-10-CM

## 2025-03-11 DIAGNOSIS — H35.3111: ICD-10-CM

## 2025-03-11 DIAGNOSIS — H25.13: ICD-10-CM

## 2025-03-11 PROCEDURE — 92014 COMPRE OPH EXAM EST PT 1/>: CPT | Performed by: OPHTHALMOLOGY

## 2025-03-11 PROCEDURE — 92134 CPTRZ OPH DX IMG PST SGM RTA: CPT | Performed by: OPHTHALMOLOGY

## 2025-03-11 ASSESSMENT — REFRACTION_MANIFEST
OD_ADD: +2.50
OD_SPHERE: -3.50
OS_SPHERE: -2.75
OD_SPHERE: -3.00
OD_AXIS: 094
OD_ADD: +2.50
OS_AXIS: 096
OS_AXIS: 098
OD_CYLINDER: -2.00
OD_AXIS: 095
OS_CYLINDER: -2.25
OS_SPHERE: -3.00
OS_CYLINDER: -2.50
OD_CYLINDER: -2.00
OS_AXIS: 084
OD_CYLINDER: -2.25
OS_VA1: 20/20
OD_ADD: +2.50
OS_ADD: +2.50
OS_VA1: 20/20
OD_VA1: 20/20
OU_VA: 20/20
OU_VA: 20/20
OS_ADD: +2.50
OD_VA1: 20/20
OD_AXIS: 087
OS_ADD: +2.50
OS_SPHERE: -3.50
OD_SPHERE: -3.50
OU_VA: 20/25-
OS_CYLINDER: -2.25
OS_VA1: 20/20-
OD_VA1: 20/20

## 2025-03-11 ASSESSMENT — CONFRONTATIONAL VISUAL FIELD TEST (CVF)
OS_FINDINGS: FULL
OD_FINDINGS: FULL

## 2025-03-11 ASSESSMENT — REFRACTION_CURRENTRX
OD_OVR_VA: 20/
OS_CYLINDER: -2.50
OD_VPRISM_DIRECTION: PROGS
OD_ADD: +2.50
OS_SPHERE: -3.25
OS_AXIS: 084
OD_AXIS: 094
OS_VPRISM_DIRECTION: PROGS
OS_ADD: +2.50
OD_CYLINDER: -1.75
OD_SPHERE: -3.50
OS_OVR_VA: 20/

## 2025-03-11 ASSESSMENT — REFRACTION_AUTOREFRACTION
OD_AXIS: 089
OS_SPHERE: -3.75
OS_AXIS: 097
OD_CYLINDER: -2.25
OD_SPHERE: -3.00
OS_CYLINDER: -2.00

## 2025-03-11 ASSESSMENT — VISUAL ACUITY
OS_BCVA: 20/25
OD_BCVA: 20/20-1

## 2025-03-11 ASSESSMENT — TONOMETRY
OS_IOP_MMHG: 15
OD_IOP_MMHG: 12

## 2025-04-03 NOTE — ED ADULT NURSE NOTE - CHIEF COMPLAINT QUOTE
PT BIBEMS w/ c/o sudden onset crushing chest pain. States he then became diaphoretic and SOB. States CP has resolved on arrival to ED. Pt. endorsing severe anxiety at this time. none